# Patient Record
Sex: MALE | NOT HISPANIC OR LATINO | URBAN - METROPOLITAN AREA
[De-identification: names, ages, dates, MRNs, and addresses within clinical notes are randomized per-mention and may not be internally consistent; named-entity substitution may affect disease eponyms.]

---

## 2021-09-13 ENCOUNTER — APPOINTMENT (RX ONLY)
Dept: URBAN - METROPOLITAN AREA CLINIC 349 | Facility: CLINIC | Age: 21
Setting detail: DERMATOLOGY
End: 2021-09-13

## 2021-09-13 DIAGNOSIS — Z80.8 FAMILY HISTORY OF MALIGNANT NEOPLASM OF OTHER ORGANS OR SYSTEMS: ICD-10-CM

## 2021-09-13 DIAGNOSIS — Z12.83 ENCOUNTER FOR SCREENING FOR MALIGNANT NEOPLASM OF SKIN: ICD-10-CM

## 2021-09-13 DIAGNOSIS — L70.0 ACNE VULGARIS: ICD-10-CM | Status: INADEQUATELY CONTROLLED

## 2021-09-13 DIAGNOSIS — L98.8 OTHER SPECIFIED DISORDERS OF THE SKIN AND SUBCUTANEOUS TISSUE: ICD-10-CM

## 2021-09-13 DIAGNOSIS — D22 MELANOCYTIC NEVI: ICD-10-CM

## 2021-09-13 DIAGNOSIS — Z71.89 OTHER SPECIFIED COUNSELING: ICD-10-CM

## 2021-09-13 PROBLEM — D22.4 MELANOCYTIC NEVI OF SCALP AND NECK: Status: ACTIVE | Noted: 2021-09-13

## 2021-09-13 PROCEDURE — ? PRESCRIPTION MEDICATION MANAGEMENT

## 2021-09-13 PROCEDURE — ? PRESCRIPTION

## 2021-09-13 PROCEDURE — ? COUNSELING

## 2021-09-13 PROCEDURE — ? SUNSCREEN RECOMMENDATIONS

## 2021-09-13 PROCEDURE — ? OTHER

## 2021-09-13 PROCEDURE — 99204 OFFICE O/P NEW MOD 45 MIN: CPT

## 2021-09-13 PROCEDURE — ? EDUCATIONAL RESOURCES PROVIDED

## 2021-09-13 RX ORDER — MINOCYCLINE HYDROCHLORIDE 100 MG/1
CAPSULE ORAL
Qty: 45 | Refills: 0 | Status: ERX | COMMUNITY
Start: 2021-09-13

## 2021-09-13 RX ORDER — ADAPALENE AND BENZOYL PEROXIDE 3; 25 MG/G; MG/G
GEL TOPICAL
Qty: 60 | Refills: 3 | Status: ERX | COMMUNITY
Start: 2021-09-13

## 2021-09-13 RX ADMIN — MINOCYCLINE HYDROCHLORIDE: 100 CAPSULE ORAL at 00:00

## 2021-09-13 RX ADMIN — ADAPALENE AND BENZOYL PEROXIDE: 3; 25 GEL TOPICAL at 00:00

## 2021-09-13 ASSESSMENT — LOCATION SIMPLE DESCRIPTION DERM
LOCATION SIMPLE: LEFT UPPER BACK
LOCATION SIMPLE: PLANTAR SURFACE OF RIGHT 3RD TOE
LOCATION SIMPLE: INFERIOR FOREHEAD
LOCATION SIMPLE: LEFT CHEEK
LOCATION SIMPLE: POSTERIOR SCALP
LOCATION SIMPLE: PLANTAR SURFACE OF LEFT 3RD TOE

## 2021-09-13 ASSESSMENT — LOCATION DETAILED DESCRIPTION DERM
LOCATION DETAILED: RIGHT LATERAL PLANTAR 3RD TOE
LOCATION DETAILED: INFERIOR MID FOREHEAD
LOCATION DETAILED: LEFT SUPERIOR OCCIPITAL SCALP
LOCATION DETAILED: LEFT LATERAL PLANTAR 3RD TOE
LOCATION DETAILED: LEFT SUPERIOR MEDIAL UPPER BACK
LOCATION DETAILED: LEFT INFERIOR CENTRAL MALAR CHEEK

## 2021-09-13 ASSESSMENT — LOCATION ZONE DERM
LOCATION ZONE: TOE
LOCATION ZONE: TRUNK
LOCATION ZONE: SCALP
LOCATION ZONE: FACE

## 2021-09-13 NOTE — HPI: PIMPLES (ACNE)
How Severe Is Your Acne?: moderate
Is This A New Presentation, Or A Follow-Up?: Acne
Additional Comments (Use Complete Sentences): Patient has been struggling with acne for several years. Patient stated he was prescribed a topical that dried his face out a good bit. Patients mother stated they were using Differin gel as well.

## 2021-09-13 NOTE — PROCEDURE: COUNSELING
Azithromycin Pregnancy And Lactation Text: This medication is considered safe during pregnancy and is also secreted in breast milk.
Birth Control Pills Counseling: Birth Control Pill Counseling: I discussed with the patient the potential side effects of OCPs including but not limited to increased risk of stroke, heart attack, thrombophlebitis, deep venous thrombosis, hepatic adenomas, breast changes, GI upset, headaches, and depression.  The patient verbalized understanding of the proper use and possible adverse effects of OCPs. All of the patient's questions and concerns were addressed.
Detail Level: Generalized
Erythromycin Pregnancy And Lactation Text: This medication is Pregnancy Category B and is considered safe during pregnancy. It is also excreted in breast milk.
Erythromycin Counseling:  I discussed with the patient the risks of erythromycin including but not limited to GI upset, allergic reaction, drug rash, diarrhea, increase in liver enzymes, and yeast infections.
Detail Level: Detailed
Spironolactone Counseling: Patient advised regarding risks of diarrhea, abdominal pain, hyperkalemia, birth defects (for female patients), liver toxicity and renal toxicity. The patient may need blood work to monitor liver and kidney function and potassium levels while on therapy. The patient verbalized understanding of the proper use and possible adverse effects of spironolactone.  All of the patient's questions and concerns were addressed.
High Dose Vitamin A Pregnancy And Lactation Text: High dose vitamin A therapy is contraindicated during pregnancy and breast feeding.
Include Pregnancy/Lactation Warning?: No
Tazorac Pregnancy And Lactation Text: This medication is not safe during pregnancy. It is unknown if this medication is excreted in breast milk.
Topical Sulfur Applications Counseling: Topical Sulfur Counseling: Patient counseled that this medication may cause skin irritation or allergic reactions.  In the event of skin irritation, the patient was advised to reduce the amount of the drug applied or use it less frequently.   The patient verbalized understanding of the proper use and possible adverse effects of topical sulfur application.  All of the patient's questions and concerns were addressed.
Topical Clindamycin Pregnancy And Lactation Text: This medication is Pregnancy Category B and is considered safe during pregnancy. It is unknown if it is excreted in breast milk.
High Dose Vitamin A Counseling: Side effects reviewed, pt to contact office should one occur.
Detail Level: Zone
Azithromycin Counseling:  I discussed with the patient the risks of azithromycin including but not limited to GI upset, allergic reaction, drug rash, diarrhea, and yeast infections.
Benzoyl Peroxide Pregnancy And Lactation Text: This medication is Pregnancy Category C. It is unknown if benzoyl peroxide is excreted in breast milk.
Topical Clindamycin Counseling: Patient counseled that this medication may cause skin irritation or allergic reactions.  In the event of skin irritation, the patient was advised to reduce the amount of the drug applied or use it less frequently.   The patient verbalized understanding of the proper use and possible adverse effects of clindamycin.  All of the patient's questions and concerns were addressed.
Minocycline Counseling: Patient advised regarding possible photosensitivity and discoloration of the teeth, skin, lips, tongue and gums.  Patient instructed to avoid sunlight, if possible.  When exposed to sunlight, patients should wear protective clothing, sunglasses, and sunscreen.  The patient was instructed to call the office immediately if the following severe adverse effects occur:  hearing changes, easy bruising/bleeding, severe headache, or vision changes.  The patient verbalized understanding of the proper use and possible adverse effects of minocycline.  All of the patient's questions and concerns were addressed.
Dapsone Pregnancy And Lactation Text: This medication is Pregnancy Category C and is not considered safe during pregnancy or breast feeding.
Bactrim Counseling:  I discussed with the patient the risks of sulfa antibiotics including but not limited to GI upset, allergic reaction, drug rash, diarrhea, dizziness, photosensitivity, and yeast infections.  Rarely, more serious reactions can occur including but not limited to aplastic anemia, agranulocytosis, methemoglobinemia, blood dyscrasias, liver or kidney failure, lung infiltrates or desquamative/blistering drug rashes.
Topical Sulfur Applications Pregnancy And Lactation Text: This medication is Pregnancy Category C and has an unknown safety profile during pregnancy. It is unknown if this topical medication is excreted in breast milk.
Bactrim Pregnancy And Lactation Text: This medication is Pregnancy Category D and is known to cause fetal risk.  It is also excreted in breast milk.
Spironolactone Pregnancy And Lactation Text: This medication can cause feminization of the male fetus and should be avoided during pregnancy. The active metabolite is also found in breast milk.
Tetracycline Pregnancy And Lactation Text: This medication is Pregnancy Category D and not consider safe during pregnancy. It is also excreted in breast milk.
Sarecycline Counseling: Patient advised regarding possible photosensitivity and discoloration of the teeth, skin, lips, tongue and gums.  Patient instructed to avoid sunlight, if possible.  When exposed to sunlight, patients should wear protective clothing, sunglasses, and sunscreen.  The patient was instructed to call the office immediately if the following severe adverse effects occur:  hearing changes, easy bruising/bleeding, severe headache, or vision changes.  The patient verbalized understanding of the proper use and possible adverse effects of sarecycline.  All of the patient's questions and concerns were addressed.
Doxycycline Pregnancy And Lactation Text: This medication is Pregnancy Category D and not consider safe during pregnancy. It is also excreted in breast milk but is considered safe for shorter treatment courses.
Isotretinoin Counseling: Patient should get monthly blood tests, not donate blood, not drive at night if vision affected, not share medication, and not undergo elective surgery for 6 months after tx completed. Side effects reviewed, pt to contact office should one occur.
Tazorac Counseling:  Patient advised that medication is irritating and drying.  Patient may need to apply sparingly and wash off after an hour before eventually leaving it on overnight.  The patient verbalized understanding of the proper use and possible adverse effects of tazorac.  All of the patient's questions and concerns were addressed.
Topical Retinoid counseling:  Patient advised to apply a pea-sized amount only at bedtime and wait 30 minutes after washing their face before applying.  If too drying, patient may add a non-comedogenic moisturizer. The patient verbalized understanding of the proper use and possible adverse effects of retinoids.  All of the patient's questions and concerns were addressed.
Benzoyl Peroxide Counseling: Patient counseled that medicine may cause skin irritation and bleach clothing.  In the event of skin irritation, the patient was advised to reduce the amount of the drug applied or use it less frequently.   The patient verbalized understanding of the proper use and possible adverse effects of benzoyl peroxide.  All of the patient's questions and concerns were addressed.
Birth Control Pills Pregnancy And Lactation Text: This medication should be avoided if pregnant and for the first 30 days post-partum.
Doxycycline Counseling:  Patient counseled regarding possible photosensitivity and increased risk for sunburn.  Patient instructed to avoid sunlight, if possible.  When exposed to sunlight, patients should wear protective clothing, sunglasses, and sunscreen.  The patient was instructed to call the office immediately if the following severe adverse effects occur:  hearing changes, easy bruising/bleeding, severe headache, or vision changes.  The patient verbalized understanding of the proper use and possible adverse effects of doxycycline.  All of the patient's questions and concerns were addressed.
Tetracycline Counseling: Patient counseled regarding possible photosensitivity and increased risk for sunburn.  Patient instructed to avoid sunlight, if possible.  When exposed to sunlight, patients should wear protective clothing, sunglasses, and sunscreen.  The patient was instructed to call the office immediately if the following severe adverse effects occur:  hearing changes, easy bruising/bleeding, severe headache, or vision changes.  The patient verbalized understanding of the proper use and possible adverse effects of tetracycline.  All of the patient's questions and concerns were addressed. Patient understands to avoid pregnancy while on therapy due to potential birth defects.
Topical Retinoid Pregnancy And Lactation Text: This medication is Pregnancy Category C. It is unknown if this medication is excreted in breast milk.
Dapsone Counseling: I discussed with the patient the risks of dapsone including but not limited to hemolytic anemia, agranulocytosis, rashes, methemoglobinemia, kidney failure, peripheral neuropathy, headaches, GI upset, and liver toxicity.  Patients who start dapsone require monitoring including baseline LFTs and weekly CBCs for the first month, then every month thereafter.  The patient verbalized understanding of the proper use and possible adverse effects of dapsone.  All of the patient's questions and concerns were addressed.
Isotretinoin Pregnancy And Lactation Text: This medication is Pregnancy Category X and is considered extremely dangerous during pregnancy. It is unknown if it is excreted in breast milk.

## 2021-09-13 NOTE — PROCEDURE: PRESCRIPTION MEDICATION MANAGEMENT
Initiate Treatment: Epiduo forte pea sized amount to entire face once nightly \\n\\nWash face twice daily with gentle cleanser\\n\\nMinocycline 100mg once daily for six weeks
Render In Strict Bullet Format?: No
Detail Level: Zone
Samples Given: Epiduo

## 2021-09-13 NOTE — PROCEDURE: OTHER
Note Text (......Xxx Chief Complaint.): This diagnosis correlates with the
Detail Level: Zone
Other (Free Text): Recommended aquaphor daily as needed
Render Risk Assessment In Note?: yes
Other (Free Text): Patient has been struggling with acne on his face and back for several years. They had been prescribed Clindamycin/tretinoin cream. Patient’s mother stated that they had used that along with some Differin gel and had gotten really dried out.

## 2021-09-13 NOTE — HPI: SKIN LESIONS
How Severe Is Your Skin Lesion?: mild
Have Your Skin Lesions Been Treated?: not been treated
Is This A New Presentation, Or A Follow-Up?: Skin Lesions
Which Family Member (Optional)?: Mother
Additional History: Patient has a lesion on his scalp that has been present for several years. Patient is unsure of any changes.

## 2021-10-25 ENCOUNTER — APPOINTMENT (RX ONLY)
Dept: URBAN - METROPOLITAN AREA CLINIC 349 | Facility: CLINIC | Age: 21
Setting detail: DERMATOLOGY
End: 2021-10-25

## 2021-10-25 DIAGNOSIS — Z80.8 FAMILY HISTORY OF MALIGNANT NEOPLASM OF OTHER ORGANS OR SYSTEMS: ICD-10-CM

## 2021-10-25 DIAGNOSIS — Z12.83 ENCOUNTER FOR SCREENING FOR MALIGNANT NEOPLASM OF SKIN: ICD-10-CM

## 2021-10-25 DIAGNOSIS — Z71.89 OTHER SPECIFIED COUNSELING: ICD-10-CM

## 2021-10-25 DIAGNOSIS — D22 MELANOCYTIC NEVI: ICD-10-CM

## 2021-10-25 DIAGNOSIS — L74.0 MILIARIA RUBRA: ICD-10-CM

## 2021-10-25 DIAGNOSIS — L70.0 ACNE VULGARIS: ICD-10-CM | Status: RESOLVING

## 2021-10-25 DIAGNOSIS — L98.8 OTHER SPECIFIED DISORDERS OF THE SKIN AND SUBCUTANEOUS TISSUE: ICD-10-CM | Status: RESOLVING

## 2021-10-25 PROBLEM — D22.5 MELANOCYTIC NEVI OF TRUNK: Status: ACTIVE | Noted: 2021-10-25

## 2021-10-25 PROBLEM — D22.71 MELANOCYTIC NEVI OF RIGHT LOWER LIMB, INCLUDING HIP: Status: ACTIVE | Noted: 2021-10-25

## 2021-10-25 PROBLEM — D22.72 MELANOCYTIC NEVI OF LEFT LOWER LIMB, INCLUDING HIP: Status: ACTIVE | Noted: 2021-10-25

## 2021-10-25 PROBLEM — D22.4 MELANOCYTIC NEVI OF SCALP AND NECK: Status: ACTIVE | Noted: 2021-10-25

## 2021-10-25 PROCEDURE — ? PRESCRIPTION

## 2021-10-25 PROCEDURE — 99213 OFFICE O/P EST LOW 20 MIN: CPT

## 2021-10-25 PROCEDURE — ? SUNSCREEN RECOMMENDATIONS

## 2021-10-25 PROCEDURE — ? PRESCRIPTION MEDICATION MANAGEMENT

## 2021-10-25 PROCEDURE — ? COUNSELING

## 2021-10-25 PROCEDURE — ? OTHER

## 2021-10-25 RX ORDER — MINOCYCLINE HYDROCHLORIDE 100 MG/1
CAPSULE ORAL
Qty: 30 | Refills: 2 | Status: ERX

## 2021-10-25 ASSESSMENT — LOCATION SIMPLE DESCRIPTION DERM
LOCATION SIMPLE: LEFT POSTERIOR THIGH
LOCATION SIMPLE: INFERIOR FOREHEAD
LOCATION SIMPLE: POSTERIOR SCALP
LOCATION SIMPLE: LEFT PRETIBIAL REGION
LOCATION SIMPLE: LEFT UPPER BACK
LOCATION SIMPLE: RIGHT PRETIBIAL REGION
LOCATION SIMPLE: PLANTAR SURFACE OF LEFT 3RD TOE
LOCATION SIMPLE: LEFT CHEEK
LOCATION SIMPLE: PLANTAR SURFACE OF RIGHT 3RD TOE
LOCATION SIMPLE: ABDOMEN

## 2021-10-25 ASSESSMENT — LOCATION DETAILED DESCRIPTION DERM
LOCATION DETAILED: LEFT SUPERIOR MEDIAL UPPER BACK
LOCATION DETAILED: LEFT INFERIOR MEDIAL UPPER BACK
LOCATION DETAILED: RIGHT PROXIMAL PRETIBIAL REGION
LOCATION DETAILED: LEFT PROXIMAL POSTERIOR THIGH
LOCATION DETAILED: LEFT SUPERIOR UPPER BACK
LOCATION DETAILED: LEFT INFERIOR CENTRAL MALAR CHEEK
LOCATION DETAILED: PERIUMBILICAL SKIN
LOCATION DETAILED: LEFT SUPERIOR OCCIPITAL SCALP
LOCATION DETAILED: LEFT LATERAL PLANTAR 3RD TOE
LOCATION DETAILED: RIGHT LATERAL PLANTAR 3RD TOE
LOCATION DETAILED: LEFT DISTAL PRETIBIAL REGION
LOCATION DETAILED: INFERIOR MID FOREHEAD

## 2021-10-25 ASSESSMENT — LOCATION ZONE DERM
LOCATION ZONE: FACE
LOCATION ZONE: TRUNK
LOCATION ZONE: TOE
LOCATION ZONE: LEG
LOCATION ZONE: SCALP

## 2021-10-25 NOTE — PROCEDURE: PRESCRIPTION MEDICATION MANAGEMENT
Render In Strict Bullet Format?: No
Modify Regimen: Minocycline 100mg once daily for another month and then for a week or two when flared
Detail Level: Zone
Continue Regimen: Epiduo forte pea sized amount to entire face once nightly \\n\\nWash face twice daily with gentle cleanser

## 2021-10-25 NOTE — HPI: SKIN LESIONS
How Severe Is Your Skin Lesion?: mild
Have Your Skin Lesions Been Treated?: not been treated
Is This A New Presentation, Or A Follow-Up?: Skin Lesions
Which Family Member (Optional)?: Mother
Additional History: Patient’s mother would like to have patient’s back looked at. They deny any changes.

## 2021-10-25 NOTE — PROCEDURE: COUNSELING
Detail Level: Zone
Bactrim Pregnancy And Lactation Text: This medication is Pregnancy Category D and is known to cause fetal risk.  It is also excreted in breast milk.
Topical Clindamycin Pregnancy And Lactation Text: This medication is Pregnancy Category B and is considered safe during pregnancy. It is unknown if it is excreted in breast milk.
Erythromycin Pregnancy And Lactation Text: This medication is Pregnancy Category B and is considered safe during pregnancy. It is also excreted in breast milk.
Spironolactone Pregnancy And Lactation Text: This medication can cause feminization of the male fetus and should be avoided during pregnancy. The active metabolite is also found in breast milk.
Dapsone Counseling: I discussed with the patient the risks of dapsone including but not limited to hemolytic anemia, agranulocytosis, rashes, methemoglobinemia, kidney failure, peripheral neuropathy, headaches, GI upset, and liver toxicity.  Patients who start dapsone require monitoring including baseline LFTs and weekly CBCs for the first month, then every month thereafter.  The patient verbalized understanding of the proper use and possible adverse effects of dapsone.  All of the patient's questions and concerns were addressed.
High Dose Vitamin A Pregnancy And Lactation Text: High dose vitamin A therapy is contraindicated during pregnancy and breast feeding.
Dapsone Pregnancy And Lactation Text: This medication is Pregnancy Category C and is not considered safe during pregnancy or breast feeding.
Minocycline Pregnancy And Lactation Text: This medication is Pregnancy Category D and not consider safe during pregnancy. It is also excreted in breast milk.
Topical Retinoid Pregnancy And Lactation Text: This medication is Pregnancy Category C. It is unknown if this medication is excreted in breast milk.
Birth Control Pills Counseling: Birth Control Pill Counseling: I discussed with the patient the potential side effects of OCPs including but not limited to increased risk of stroke, heart attack, thrombophlebitis, deep venous thrombosis, hepatic adenomas, breast changes, GI upset, headaches, and depression.  The patient verbalized understanding of the proper use and possible adverse effects of OCPs. All of the patient's questions and concerns were addressed.
Include Pregnancy/Lactation Warning?: No
Sarecycline Counseling: Patient advised regarding possible photosensitivity and discoloration of the teeth, skin, lips, tongue and gums.  Patient instructed to avoid sunlight, if possible.  When exposed to sunlight, patients should wear protective clothing, sunglasses, and sunscreen.  The patient was instructed to call the office immediately if the following severe adverse effects occur:  hearing changes, easy bruising/bleeding, severe headache, or vision changes.  The patient verbalized understanding of the proper use and possible adverse effects of sarecycline.  All of the patient's questions and concerns were addressed.
Doxycycline Counseling:  Patient counseled regarding possible photosensitivity and increased risk for sunburn.  Patient instructed to avoid sunlight, if possible.  When exposed to sunlight, patients should wear protective clothing, sunglasses, and sunscreen.  The patient was instructed to call the office immediately if the following severe adverse effects occur:  hearing changes, easy bruising/bleeding, severe headache, or vision changes.  The patient verbalized understanding of the proper use and possible adverse effects of doxycycline.  All of the patient's questions and concerns were addressed.
Topical Sulfur Applications Counseling: Topical Sulfur Counseling: Patient counseled that this medication may cause skin irritation or allergic reactions.  In the event of skin irritation, the patient was advised to reduce the amount of the drug applied or use it less frequently.   The patient verbalized understanding of the proper use and possible adverse effects of topical sulfur application.  All of the patient's questions and concerns were addressed.
Bactrim Counseling:  I discussed with the patient the risks of sulfa antibiotics including but not limited to GI upset, allergic reaction, drug rash, diarrhea, dizziness, photosensitivity, and yeast infections.  Rarely, more serious reactions can occur including but not limited to aplastic anemia, agranulocytosis, methemoglobinemia, blood dyscrasias, liver or kidney failure, lung infiltrates or desquamative/blistering drug rashes.
Tazorac Pregnancy And Lactation Text: This medication is not safe during pregnancy. It is unknown if this medication is excreted in breast milk.
Detail Level: Generalized
Spironolactone Counseling: Patient advised regarding risks of diarrhea, abdominal pain, hyperkalemia, birth defects (for female patients), liver toxicity and renal toxicity. The patient may need blood work to monitor liver and kidney function and potassium levels while on therapy. The patient verbalized understanding of the proper use and possible adverse effects of spironolactone.  All of the patient's questions and concerns were addressed.
High Dose Vitamin A Counseling: Side effects reviewed, pt to contact office should one occur.
Topical Sulfur Applications Pregnancy And Lactation Text: This medication is Pregnancy Category C and has an unknown safety profile during pregnancy. It is unknown if this topical medication is excreted in breast milk.
Topical Retinoid counseling:  Patient advised to apply a pea-sized amount only at bedtime and wait 30 minutes after washing their face before applying.  If too drying, patient may add a non-comedogenic moisturizer. The patient verbalized understanding of the proper use and possible adverse effects of retinoids.  All of the patient's questions and concerns were addressed.
Erythromycin Counseling:  I discussed with the patient the risks of erythromycin including but not limited to GI upset, allergic reaction, drug rash, diarrhea, increase in liver enzymes, and yeast infections.
Minocycline Counseling: Patient advised regarding possible photosensitivity and discoloration of the teeth, skin, lips, tongue and gums.  Patient instructed to avoid sunlight, if possible.  When exposed to sunlight, patients should wear protective clothing, sunglasses, and sunscreen.  The patient was instructed to call the office immediately if the following severe adverse effects occur:  hearing changes, easy bruising/bleeding, severe headache, or vision changes.  The patient verbalized understanding of the proper use and possible adverse effects of minocycline.  All of the patient's questions and concerns were addressed.
Azithromycin Counseling:  I discussed with the patient the risks of azithromycin including but not limited to GI upset, allergic reaction, drug rash, diarrhea, and yeast infections.
Topical Clindamycin Counseling: Patient counseled that this medication may cause skin irritation or allergic reactions.  In the event of skin irritation, the patient was advised to reduce the amount of the drug applied or use it less frequently.   The patient verbalized understanding of the proper use and possible adverse effects of clindamycin.  All of the patient's questions and concerns were addressed.
Azithromycin Pregnancy And Lactation Text: This medication is considered safe during pregnancy and is also secreted in breast milk.
Birth Control Pills Pregnancy And Lactation Text: This medication should be avoided if pregnant and for the first 30 days post-partum.
Benzoyl Peroxide Counseling: Patient counseled that medicine may cause skin irritation and bleach clothing.  In the event of skin irritation, the patient was advised to reduce the amount of the drug applied or use it less frequently.   The patient verbalized understanding of the proper use and possible adverse effects of benzoyl peroxide.  All of the patient's questions and concerns were addressed.
Tetracycline Counseling: Patient counseled regarding possible photosensitivity and increased risk for sunburn.  Patient instructed to avoid sunlight, if possible.  When exposed to sunlight, patients should wear protective clothing, sunglasses, and sunscreen.  The patient was instructed to call the office immediately if the following severe adverse effects occur:  hearing changes, easy bruising/bleeding, severe headache, or vision changes.  The patient verbalized understanding of the proper use and possible adverse effects of tetracycline.  All of the patient's questions and concerns were addressed. Patient understands to avoid pregnancy while on therapy due to potential birth defects.
Isotretinoin Pregnancy And Lactation Text: This medication is Pregnancy Category X and is considered extremely dangerous during pregnancy. It is unknown if it is excreted in breast milk.
Benzoyl Peroxide Pregnancy And Lactation Text: This medication is Pregnancy Category C. It is unknown if benzoyl peroxide is excreted in breast milk.
Tazorac Counseling:  Patient advised that medication is irritating and drying.  Patient may need to apply sparingly and wash off after an hour before eventually leaving it on overnight.  The patient verbalized understanding of the proper use and possible adverse effects of tazorac.  All of the patient's questions and concerns were addressed.
Isotretinoin Counseling: Patient should get monthly blood tests, not donate blood, not drive at night if vision affected, not share medication, and not undergo elective surgery for 6 months after tx completed. Side effects reviewed, pt to contact office should one occur.
Doxycycline Pregnancy And Lactation Text: This medication is Pregnancy Category D and not consider safe during pregnancy. It is also excreted in breast milk but is considered safe for shorter treatment courses.

## 2021-10-25 NOTE — PROCEDURE: OTHER
detailed exam
Note Text (......Xxx Chief Complaint.): This diagnosis correlates with the
Other (Free Text): Patient stated he has seen some improvement but has still been breaking out. Discussed continuing the antibiotic for another month and then only for flares.
Detail Level: Zone
Other (Free Text): Recommended aquaphor daily as needed
Render Risk Assessment In Note?: yes
Other (Free Text): They stated it isn’t bothersome. Suggested hydrocortisone cream if it becomes bothersome or to contact the office.

## 2021-11-22 ENCOUNTER — HOSPITAL ENCOUNTER (OUTPATIENT)
Dept: ULTRASOUND IMAGING | Age: 21
Discharge: HOME | End: 2021-11-22
Attending: UROLOGY
Payer: COMMERCIAL

## 2021-11-22 DIAGNOSIS — N31.9 NEUROGENIC BLADDER: ICD-10-CM

## 2021-11-22 PROCEDURE — 76770 US EXAM ABDO BACK WALL COMP: CPT

## 2022-01-31 ENCOUNTER — APPOINTMENT (RX ONLY)
Dept: URBAN - METROPOLITAN AREA CLINIC 349 | Facility: CLINIC | Age: 22
Setting detail: DERMATOLOGY
End: 2022-01-31

## 2022-01-31 DIAGNOSIS — L70.0 ACNE VULGARIS: ICD-10-CM | Status: IMPROVED

## 2022-01-31 PROCEDURE — 99213 OFFICE O/P EST LOW 20 MIN: CPT

## 2022-01-31 PROCEDURE — ? PRESCRIPTION MEDICATION MANAGEMENT

## 2022-01-31 PROCEDURE — ? PRESCRIPTION

## 2022-01-31 PROCEDURE — ? OTHER

## 2022-01-31 PROCEDURE — ? COUNSELING

## 2022-01-31 RX ORDER — ADAPALENE AND BENZOYL PEROXIDE 1; 25 MG/G; MG/G
GEL TOPICAL
Qty: 45 | Refills: 3 | Status: ERX | COMMUNITY
Start: 2022-01-31

## 2022-01-31 RX ADMIN — ADAPALENE AND BENZOYL PEROXIDE: 1; 25 GEL TOPICAL at 00:00

## 2022-01-31 ASSESSMENT — LOCATION SIMPLE DESCRIPTION DERM
LOCATION SIMPLE: INFERIOR FOREHEAD
LOCATION SIMPLE: LEFT UPPER BACK
LOCATION SIMPLE: LEFT CHEEK

## 2022-01-31 ASSESSMENT — LOCATION DETAILED DESCRIPTION DERM
LOCATION DETAILED: LEFT INFERIOR CENTRAL MALAR CHEEK
LOCATION DETAILED: INFERIOR MID FOREHEAD
LOCATION DETAILED: LEFT SUPERIOR MEDIAL UPPER BACK

## 2022-01-31 ASSESSMENT — LOCATION ZONE DERM
LOCATION ZONE: FACE
LOCATION ZONE: TRUNK

## 2022-01-31 NOTE — PROCEDURE: OTHER
Note Text (......Xxx Chief Complaint.): This diagnosis correlates with the
Other (Free Text): Patient and patient?s mother stated they have seen improvement and are happy with treatment regimen. They stated they haven?t needed the antibiotic.
Detail Level: Zone
Render Risk Assessment In Note?: yes

## 2022-01-31 NOTE — PROCEDURE: COUNSELING
Tazorac Counseling:  Patient advised that medication is irritating and drying.  Patient may need to apply sparingly and wash off after an hour before eventually leaving it on overnight.  The patient verbalized understanding of the proper use and possible adverse effects of tazorac.  All of the patient's questions and concerns were addressed.
Sarecycline Pregnancy And Lactation Text: This medication is Pregnancy Category D and not consider safe during pregnancy. It is also excreted in breast milk.
Spironolactone Counseling: Patient advised regarding risks of diarrhea, abdominal pain, hyperkalemia, birth defects (for female patients), liver toxicity and renal toxicity. The patient may need blood work to monitor liver and kidney function and potassium levels while on therapy. The patient verbalized understanding of the proper use and possible adverse effects of spironolactone.  All of the patient's questions and concerns were addressed.
Isotretinoin Pregnancy And Lactation Text: This medication is Pregnancy Category X and is considered extremely dangerous during pregnancy. It is unknown if it is excreted in breast milk.
Use Enhanced Medication Counseling?: No
Doxycycline Pregnancy And Lactation Text: This medication is Pregnancy Category D and not consider safe during pregnancy. It is also excreted in breast milk but is considered safe for shorter treatment courses.
Minocycline Counseling: Patient advised regarding possible photosensitivity and discoloration of the teeth, skin, lips, tongue and gums.  Patient instructed to avoid sunlight, if possible.  When exposed to sunlight, patients should wear protective clothing, sunglasses, and sunscreen.  The patient was instructed to call the office immediately if the following severe adverse effects occur:  hearing changes, easy bruising/bleeding, severe headache, or vision changes.  The patient verbalized understanding of the proper use and possible adverse effects of minocycline.  All of the patient's questions and concerns were addressed.
Azithromycin Counseling:  I discussed with the patient the risks of azithromycin including but not limited to GI upset, allergic reaction, drug rash, diarrhea, and yeast infections.
Isotretinoin Counseling: Patient should get monthly blood tests, not donate blood, not drive at night if vision affected, not share medication, and not undergo elective surgery for 6 months after tx completed. Side effects reviewed, pt to contact office should one occur.
Detail Level: Zone
Dapsone Counseling: I discussed with the patient the risks of dapsone including but not limited to hemolytic anemia, agranulocytosis, rashes, methemoglobinemia, kidney failure, peripheral neuropathy, headaches, GI upset, and liver toxicity.  Patients who start dapsone require monitoring including baseline LFTs and weekly CBCs for the first month, then every month thereafter.  The patient verbalized understanding of the proper use and possible adverse effects of dapsone.  All of the patient's questions and concerns were addressed.
High Dose Vitamin A Pregnancy And Lactation Text: High dose vitamin A therapy is contraindicated during pregnancy and breast feeding.
Birth Control Pills Counseling: Birth Control Pill Counseling: I discussed with the patient the potential side effects of OCPs including but not limited to increased risk of stroke, heart attack, thrombophlebitis, deep venous thrombosis, hepatic adenomas, breast changes, GI upset, headaches, and depression.  The patient verbalized understanding of the proper use and possible adverse effects of OCPs. All of the patient's questions and concerns were addressed.
Tetracycline Counseling: Patient counseled regarding possible photosensitivity and increased risk for sunburn.  Patient instructed to avoid sunlight, if possible.  When exposed to sunlight, patients should wear protective clothing, sunglasses, and sunscreen.  The patient was instructed to call the office immediately if the following severe adverse effects occur:  hearing changes, easy bruising/bleeding, severe headache, or vision changes.  The patient verbalized understanding of the proper use and possible adverse effects of tetracycline.  All of the patient's questions and concerns were addressed. Patient understands to avoid pregnancy while on therapy due to potential birth defects.
Spironolactone Pregnancy And Lactation Text: This medication can cause feminization of the male fetus and should be avoided during pregnancy. The active metabolite is also found in breast milk.
Benzoyl Peroxide Counseling: Patient counseled that medicine may cause skin irritation and bleach clothing.  In the event of skin irritation, the patient was advised to reduce the amount of the drug applied or use it less frequently.   The patient verbalized understanding of the proper use and possible adverse effects of benzoyl peroxide.  All of the patient's questions and concerns were addressed.
Dapsone Pregnancy And Lactation Text: This medication is Pregnancy Category C and is not considered safe during pregnancy or breast feeding.
Sarecycline Counseling: Patient advised regarding possible photosensitivity and discoloration of the teeth, skin, lips, tongue and gums.  Patient instructed to avoid sunlight, if possible.  When exposed to sunlight, patients should wear protective clothing, sunglasses, and sunscreen.  The patient was instructed to call the office immediately if the following severe adverse effects occur:  hearing changes, easy bruising/bleeding, severe headache, or vision changes.  The patient verbalized understanding of the proper use and possible adverse effects of sarecycline.  All of the patient's questions and concerns were addressed.
Topical Sulfur Applications Counseling: Topical Sulfur Counseling: Patient counseled that this medication may cause skin irritation or allergic reactions.  In the event of skin irritation, the patient was advised to reduce the amount of the drug applied or use it less frequently.   The patient verbalized understanding of the proper use and possible adverse effects of topical sulfur application.  All of the patient's questions and concerns were addressed.
Bactrim Counseling:  I discussed with the patient the risks of sulfa antibiotics including but not limited to GI upset, allergic reaction, drug rash, diarrhea, dizziness, photosensitivity, and yeast infections.  Rarely, more serious reactions can occur including but not limited to aplastic anemia, agranulocytosis, methemoglobinemia, blood dyscrasias, liver or kidney failure, lung infiltrates or desquamative/blistering drug rashes.
Bactrim Pregnancy And Lactation Text: This medication is Pregnancy Category D and is known to cause fetal risk.  It is also excreted in breast milk.
Doxycycline Counseling:  Patient counseled regarding possible photosensitivity and increased risk for sunburn.  Patient instructed to avoid sunlight, if possible.  When exposed to sunlight, patients should wear protective clothing, sunglasses, and sunscreen.  The patient was instructed to call the office immediately if the following severe adverse effects occur:  hearing changes, easy bruising/bleeding, severe headache, or vision changes.  The patient verbalized understanding of the proper use and possible adverse effects of doxycycline.  All of the patient's questions and concerns were addressed.
Erythromycin Pregnancy And Lactation Text: This medication is Pregnancy Category B and is considered safe during pregnancy. It is also excreted in breast milk.
Tazorac Pregnancy And Lactation Text: This medication is not safe during pregnancy. It is unknown if this medication is excreted in breast milk.
Topical Retinoid counseling:  Patient advised to apply a pea-sized amount only at bedtime and wait 30 minutes after washing their face before applying.  If too drying, patient may add a non-comedogenic moisturizer. The patient verbalized understanding of the proper use and possible adverse effects of retinoids.  All of the patient's questions and concerns were addressed.
Azithromycin Pregnancy And Lactation Text: This medication is considered safe during pregnancy and is also secreted in breast milk.
Birth Control Pills Pregnancy And Lactation Text: This medication should be avoided if pregnant and for the first 30 days post-partum.
Benzoyl Peroxide Pregnancy And Lactation Text: This medication is Pregnancy Category C. It is unknown if benzoyl peroxide is excreted in breast milk.
High Dose Vitamin A Counseling: Side effects reviewed, pt to contact office should one occur.
Topical Sulfur Applications Pregnancy And Lactation Text: This medication is Pregnancy Category C and has an unknown safety profile during pregnancy. It is unknown if this topical medication is excreted in breast milk.
Topical Clindamycin Counseling: Patient counseled that this medication may cause skin irritation or allergic reactions.  In the event of skin irritation, the patient was advised to reduce the amount of the drug applied or use it less frequently.   The patient verbalized understanding of the proper use and possible adverse effects of clindamycin.  All of the patient's questions and concerns were addressed.
Topical Clindamycin Pregnancy And Lactation Text: This medication is Pregnancy Category B and is considered safe during pregnancy. It is unknown if it is excreted in breast milk.
Topical Retinoid Pregnancy And Lactation Text: This medication is Pregnancy Category C. It is unknown if this medication is excreted in breast milk.
Erythromycin Counseling:  I discussed with the patient the risks of erythromycin including but not limited to GI upset, allergic reaction, drug rash, diarrhea, increase in liver enzymes, and yeast infections.

## 2022-01-31 NOTE — PROCEDURE: PRESCRIPTION MEDICATION MANAGEMENT
Render In Strict Bullet Format?: No
Continue Regimen: Epiduo forte pea sized amount to entire face once nightly \\n\\nWash face twice daily with gentle cleanser\\n\\nMinocycline 100mg once daily  for a week or two when flared
Detail Level: Zone

## 2022-10-04 ENCOUNTER — OFFICE VISIT (OUTPATIENT)
Dept: UROLOGY | Age: 22
End: 2022-10-04
Payer: COMMERCIAL

## 2022-10-04 DIAGNOSIS — N13.5 URETERAL STRICTURE: Primary | ICD-10-CM

## 2022-10-04 DIAGNOSIS — N31.9 NEUROMUSCULAR DYSFUNCTION OF BLADDER, UNSPECIFIED: ICD-10-CM

## 2022-10-04 LAB
BILIRUBIN, URINE, POC: NEGATIVE
BLOOD URINE, POC: ABNORMAL
GLUCOSE URINE, POC: NEGATIVE
KETONES, URINE, POC: NEGATIVE
LEUKOCYTE ESTERASE, URINE, POC: ABNORMAL
NITRITE, URINE, POC: NEGATIVE
PH, URINE, POC: 7 (ref 4.6–8)
PROTEIN,URINE, POC: 30
SPECIFIC GRAVITY, URINE, POC: 1.01 (ref 1–1.03)
URINALYSIS CLARITY, POC: ABNORMAL
URINALYSIS COLOR, POC: ABNORMAL
UROBILINOGEN, POC: ABNORMAL

## 2022-10-04 PROCEDURE — 99214 OFFICE O/P EST MOD 30 MIN: CPT | Performed by: NURSE PRACTITIONER

## 2022-10-04 PROCEDURE — 81003 URINALYSIS AUTO W/O SCOPE: CPT | Performed by: NURSE PRACTITIONER

## 2022-10-04 NOTE — PROGRESS NOTES
[facility]  [address]    [address], [address]  [phone]          Юлия Chaudhry  : 2000    Chief Complaint   Patient presents with    Other     Having issue getting cath in/self caths           HPI     Юлия Chaudhry is a 25 y.o. male with a PMH of spina bifida and neurogenic bladder on CIC who returns for follow up. Accompanied by mother and father. Here today w difficulty passing catheter. He is currently using a 14 fr coude catheter. Has noticed over the past couple of weeks catheter does not pass as easily as it typically does. Pressure applied to pass catheter and seems there is blockage preventing this. There has been blood present for last 2-3 days. Denies fever, N/V, or chills. About 6 months ago they changed from coloplast catheters to cure catheters. Ditropan 10 mg XL was given last year for UUI but he took it only for a short time, as he did not see significant improvement in UUI and was having side effect. No longer taking. He previously followed with Dr. Carlo Tai at [facility] pediatric urology. He has a history of bladder augmentation for a small capacity bladder with high bladder pressures in 2016 and has done well since that time. He has not had post-op urodynamics since the augmentation but had a renal US in 2021 which showed NO hydronephrosis or other upper tract abnormalities. He is on CIC 4-5 times daily per urethra and irrigates his bladder with 200 cc daily. He does have some mild leaking between caths that requires 1-2 PPD but this is not bothersome to him. No UTIs since his augmentation. No gross hematuria. He is not on any bladder medications at this time. He has never had a cystoscopy. As for his bowels, he uses a daily irrigation program for daily bowel movements and has done well with that.        Urodynamics were attempted to get a baseline prior to today's appointment but the catheter could not be placed due to his anatomy and urodynamics were aborted. History reviewed. No pertinent past medical history. History reviewed. No pertinent surgical history. Current Outpatient Medications   Medication Sig Dispense Refill    acetaminophen (TYLENOL) 325 MG tablet Take 325 mg by mouth as needed      cetirizine (ZYRTEC) 10 MG tablet Take by mouth      vitamin D (CHOLECALCIFEROL) 25 MCG (1000 UT) TABS tablet Take 1,000 Units by mouth daily      lactobacillus (CULTURELLE) CAPS capsule Take 1 capsule by mouth daily      RABEprazole (ACIPHEX) 20 MG tablet TAKE ONE TABLET BY MOUTH ONE TIME DAILY      sodium chloride 0.9 % irrigation Irrigation route daily for 30 doses. Via rosales catheter       No current facility-administered medications for this visit. Allergies   Allergen Reactions    Latex Other (See Comments)     Social History     Socioeconomic History    Marital status: Single     Spouse name: Not on file    Number of children: Not on file    Years of education: Not on file    Highest education level: Not on file   Occupational History    Not on file   Tobacco Use    Smoking status: Not on file    Smokeless tobacco: Not on file   Substance and Sexual Activity    Alcohol use: Not on file    Drug use: Not on file    Sexual activity: Not on file   Other Topics Concern    Not on file   Social History Narrative    Not on file     Social Determinants of Health     Financial Resource Strain: Not on file   Food Insecurity: Not on file   Transportation Needs: Not on file   Physical Activity: Not on file   Stress: Not on file   Social Connections: Not on file   Intimate Partner Violence: Not on file   Housing Stability: Not on file     History reviewed. No pertinent family history.     Review of Systems  Constitutional: Negative  GI: Neg GI ROS  Genitourinary: Genitourinary negative    Urinalysis  UA - Dipstick  Results for orders placed or performed in visit on 10/04/22   AMB POC URINALYSIS DIP STICK AUTO W/O MICRO   Result Value Ref Range    Color (UA POC)      Clarity (UA POC)      Glucose, Urine, POC Negative Negative    Bilirubin, Urine, POC Negative Negative    KETONES, Urine, POC Negative Negative    Specific Gravity, Urine, POC 1.010 1.001 - 1.035    Blood (UA POC) Large Negative    pH, Urine, POC 7.0 4.6 - 8.0    Protein, Urine, POC 30 Negative    Urobilinogen, POC 2 mg/dL     Nitrite, Urine, POC Negative Negative    Leukocyte Esterase, Urine, POC Small Negative       PHYSICAL EXAM    There were no vitals taken for this visit. General appearance - well appearing and in no distress, in wheelchair  Mental status - alert, oriented to person, place, and time  Neck - supple, no significant adenopathy   Heart- RRR Normal S1/S2, No murmurs  Chest/Lung-  Quiet, even and easy respiratory effort without use of accessory muscles, BS clear  Neurological - normal speech, no focal findings or movement disorder noted on gross visual exam  Musculoskeletal - normal gait and station  Skin - normal coloration and turgor, no rashes          Assessment and Plan    ICD-10-CM    1. Ureteral stricture  N13.5       2. Neuromuscular dysfunction of bladder, unspecified  N31.9 AMB POC URINALYSIS DIP STICK AUTO W/O MICRO        Attempted to pass a 12 fr coloplast catheter in office. This did not pass easily and resistance was met. We discussed cysto for further eval. Procedure for this explained and all questions answered. Will arrange in office. They will continue CIC 4-5 times per day w 14 fr. They are contemplating SPT placement. This can be discussed w Dr. Alesha Esparza after cysto. Follow up pending cysto. To call sooner if needed. Faye Pop is supervising physician today and he approves plan of care. I have spent 35 minutes today reviewing previous notes, test results and face to face with the patient as well as documenting.

## 2022-10-05 ENCOUNTER — TELEPHONE (OUTPATIENT)
Dept: UROLOGY | Age: 22
End: 2022-10-05

## 2022-10-12 ENCOUNTER — PROCEDURE VISIT (OUTPATIENT)
Dept: UROLOGY | Age: 22
End: 2022-10-12
Payer: COMMERCIAL

## 2022-10-12 DIAGNOSIS — N31.9 NEUROMUSCULAR DYSFUNCTION OF BLADDER, UNSPECIFIED: Primary | ICD-10-CM

## 2022-10-12 DIAGNOSIS — N35.011 POST-TRAUMATIC BULBOUS URETHRAL STRICTURE: ICD-10-CM

## 2022-10-12 DIAGNOSIS — N40.0 BENIGN PROSTATIC HYPERPLASIA WITHOUT LOWER URINARY TRACT SYMPTOMS: ICD-10-CM

## 2022-10-12 PROCEDURE — 52000 CYSTOURETHROSCOPY: CPT | Performed by: UROLOGY

## 2022-10-12 PROCEDURE — 99214 OFFICE O/P EST MOD 30 MIN: CPT | Performed by: UROLOGY

## 2022-10-12 NOTE — PROGRESS NOTES
[facility]  [address]   [address]  [facility], [address]  [phone]    Caden Roche  : 2000    HPI   25 y.o.  male who presents for cystoscopy to evaluate urethra due to difficulty with self cathing. He has a PMH of spina bifida and neurogenic bladder on CIC 4-5 times daily. More recently, he has had difficulty with self cathing and saw blood. Today, he has been unable to pass catheter at all. Cysto was recommended to rule out false passage. He has a history of bladder augmentation for a small capacity bladder with high bladder pressures in  and has done well since that time. He has not had post-op urodynamics since the augmentation but had a renal US in 2021 which showed NO hydronephrosis or other upper tract abnormalities. He does have some mild leaking between caths that requires 1-2 PPD but this is not bothersome to him. Has tried ditropan in the past but did not like side effects, so stopped. No UTIs since his augmentation. No gross hematuria. He is not on any bladder medications at this time. As for his bowels, he uses a daily irrigation program for daily bowel movements and has done well with that. Urodynamics were attempted to get a baseline in  but aborted due to difficult anatomy that prevented passage of urodynamics catheter. History reviewed. No pertinent past medical history. History reviewed. No pertinent surgical history. Current Outpatient Medications   Medication Sig Dispense Refill    sodium chloride 0.9 % irrigation Irrigation route daily for 30 doses.  Via rosales catheter 2000 mL 11    acetaminophen (TYLENOL) 325 MG tablet Take 325 mg by mouth as needed      cetirizine (ZYRTEC) 10 MG tablet Take by mouth      vitamin D (CHOLECALCIFEROL) 25 MCG (1000 UT) TABS tablet Take 1,000 Units by mouth daily      lactobacillus (CULTURELLE) CAPS capsule Take 1 capsule by mouth daily      RABEprazole (ACIPHEX) 20 MG tablet TAKE ONE TABLET BY MOUTH ONE TIME DAILY       No current facility-administered medications for this visit. Allergies   Allergen Reactions    Latex Other (See Comments)     Social History     Socioeconomic History    Marital status: Single     Spouse name: Not on file    Number of children: Not on file    Years of education: Not on file    Highest education level: Not on file   Occupational History    Not on file   Tobacco Use    Smoking status: Not on file    Smokeless tobacco: Not on file   Substance and Sexual Activity    Alcohol use: Not on file    Drug use: Not on file    Sexual activity: Not on file   Other Topics Concern    Not on file   Social History Narrative    Not on file     Social Determinants of Health     Financial Resource Strain: Not on file   Food Insecurity: Not on file   Transportation Needs: Not on file   Physical Activity: Not on file   Stress: Not on file   Social Connections: Not on file   Intimate Partner Violence: Not on file   Housing Stability: Not on file     History reviewed. No pertinent family history. There were no vitals taken for this visit. UA - Dipstick  Results for orders placed or performed in visit on 10/04/22   AMB POC URINALYSIS DIP STICK AUTO W/O MICRO   Result Value Ref Range    Color (UA POC)      Clarity (UA POC)      Glucose, Urine, POC Negative Negative    Bilirubin, Urine, POC Negative Negative    KETONES, Urine, POC Negative Negative    Specific Gravity, Urine, POC 1.010 1.001 - 1.035    Blood (UA POC) Large Negative    pH, Urine, POC 7.0 4.6 - 8.0    Protein, Urine, POC 30 Negative    Urobilinogen, POC 2 mg/dL     Nitrite, Urine, POC Negative Negative    Leukocyte Esterase, Urine, POC Small Negative       There were no vitals taken for this visit.      GENERAL: No acute distress, Awake, Alert, Oriented X 3, Gait normal  CARDIAC: regular rate and rhythm  CHEST AND LUNG: Easy work of breathing, clear to auscultation bilaterally, no cyanosis  ABDOMEN: soft, non tender, non-distended, positive bowel sounds, no organomegaly, no palpable masses, no guarding, no rebound tenderness  SKIN: No rash, no erythema, no lacerations or abrasions, no ecchymosis  NEUROLOGIC: cranial nerves 2-12 grossly intact       Cystoscopy Procedure:     Procedure Room # 1  Scope ID: Dispo  Assistant: EMILEE    All risks, benefits and alternatives were again reviewed with patient and she is willing to proceed at this time. Her genital area was prepped and draped and a sterile field applied. 2% lidocaine jelly was injected in the the urethra and allowed to dwell for several minutes. A flexible cystoscope was then inserted into the urethral meatus and advanced under direct vision. The urethra appeared inflamed in membranous area just before entry into prostate. Wide bore strictures present throughout that accommodated cystoscope. The bladder neck was high riding with lateral lobe encroachment in prostate but mostly open without scarring, contractures, frons or tumors present. The bladder was systematically surveyed. No bladder trabeculations were seen. No mucosal abnormalities were seen. The ureteral orifices were seen in their normal orthotopic position. The cystoscope was then removed under direct vision. The patient tolerated the procedure well. Assessment and Plan    ICD-10-CM    1. Neuromuscular dysfunction of bladder, unspecified  N31.9 CYSTOURETHROSCOPY      2. Post-traumatic bulbous urethral stricture  N35.011 CYSTOURETHROSCOPY      3. Benign prostatic hyperplasia without lower urinary tract symptoms  N40.0         Orders Placed This Encounter   Procedures    CYSTOURETHROSCOPY     Urethral Stricture:   Patient had swelling / irritation and wide bore urethral strictures that made caths difficult. Recommended 2 weeks urethral rest with placement of indwelling rosales today.      He will then return for rosales removal and resume cathing per his normal routine with 14 coudet due to BPH and bladder neck anatomy. Neurogenic Bladder:   Discussed with patient and family options of continuing CIC vs. Indwelling rosales vs. SP tube vs. Catheterizable channel. We discussed that CIC is ideal if feasible due to lower UTI risk and no requirement for rosales bag. SP tube or channel would be invasive surgery to create given his history of bladder augmentation. They will try to resume CIC after 2 weeks urethral rest.    Keep follow up with me as scheduled 12/2022. Patient had a complete established visit today for long term neurogenic bladder management that is separately identifiable from procedure performed today for identification of urethral stricture / difficulty with cathing. Complete documentation of this separate visit is present. I have spent 30 minutes today reviewing previous notes, test results and face to face with the patient as well as documenting. Bennie Ashraf M.D.     [facility]  [place] [address]  Phone: [phone]  Fax: [phone]

## 2022-10-13 DIAGNOSIS — N31.9 NEUROMUSCULAR DYSFUNCTION OF BLADDER, UNSPECIFIED: Primary | ICD-10-CM

## 2022-10-13 RX ORDER — MAGNESIUM HYDROXIDE 1200 MG/15ML
LIQUID ORAL
Qty: 2000 ML | Refills: 11 | Status: SHIPPED | OUTPATIENT
Start: 2022-10-13 | End: 2022-10-20 | Stop reason: SDUPTHER

## 2022-10-13 NOTE — TELEPHONE ENCOUNTER
----- Message from Tierra Barrientos MD sent at 10/12/2022 11:17 AM EDT -----  Regarding: Refill Normal Salien  Please refill normal saline and sent to patient's pharmacy for catheter irrigation. Thanks!   lAyce

## 2022-10-20 ENCOUNTER — TRANSCRIBE ORDERS (OUTPATIENT)
Dept: UROLOGY | Age: 22
End: 2022-10-20

## 2022-10-20 DIAGNOSIS — N31.9 NEUROMUSCULAR DYSFUNCTION OF BLADDER, UNSPECIFIED: ICD-10-CM

## 2022-10-20 RX ORDER — MAGNESIUM HYDROXIDE 1200 MG/15ML
LIQUID ORAL
Qty: 6000 ML | Refills: 11 | Status: SHIPPED | OUTPATIENT
Start: 2022-10-20

## 2022-11-15 ENCOUNTER — APPOINTMENT (RX ONLY)
Dept: URBAN - METROPOLITAN AREA CLINIC 330 | Facility: CLINIC | Age: 22
Setting detail: DERMATOLOGY
End: 2022-11-15

## 2022-11-15 DIAGNOSIS — L70.0 ACNE VULGARIS: ICD-10-CM | Status: STABLE

## 2022-11-15 DIAGNOSIS — D22 MELANOCYTIC NEVI: ICD-10-CM

## 2022-11-15 DIAGNOSIS — Z71.89 OTHER SPECIFIED COUNSELING: ICD-10-CM

## 2022-11-15 DIAGNOSIS — Z80.8 FAMILY HISTORY OF MALIGNANT NEOPLASM OF OTHER ORGANS OR SYSTEMS: ICD-10-CM

## 2022-11-15 DIAGNOSIS — Z12.83 ENCOUNTER FOR SCREENING FOR MALIGNANT NEOPLASM OF SKIN: ICD-10-CM

## 2022-11-15 PROBLEM — D22.4 MELANOCYTIC NEVI OF SCALP AND NECK: Status: ACTIVE | Noted: 2022-11-15

## 2022-11-15 PROBLEM — D22.72 MELANOCYTIC NEVI OF LEFT LOWER LIMB, INCLUDING HIP: Status: ACTIVE | Noted: 2022-11-15

## 2022-11-15 PROBLEM — D22.5 MELANOCYTIC NEVI OF TRUNK: Status: ACTIVE | Noted: 2022-11-15

## 2022-11-15 PROBLEM — D22.71 MELANOCYTIC NEVI OF RIGHT LOWER LIMB, INCLUDING HIP: Status: ACTIVE | Noted: 2022-11-15

## 2022-11-15 PROCEDURE — 99213 OFFICE O/P EST LOW 20 MIN: CPT

## 2022-11-15 PROCEDURE — ? COUNSELING

## 2022-11-15 PROCEDURE — ? SUNSCREEN RECOMMENDATIONS

## 2022-11-15 PROCEDURE — ? PRESCRIPTION

## 2022-11-15 PROCEDURE — ? FULL BODY SKIN EXAM

## 2022-11-15 PROCEDURE — ? PRESCRIPTION MEDICATION MANAGEMENT

## 2022-11-15 RX ORDER — CLINDAMYCIN PHOSPHATE 10 MG/G
GEL TOPICAL
Qty: 60 | Refills: 4 | Status: ERX | COMMUNITY
Start: 2022-11-15

## 2022-11-15 RX ORDER — CLINDAMYCIN PHOSPHATE 10 MG/G
GEL TOPICAL
Qty: 60 | Refills: 4 | Status: CANCELLED | COMMUNITY
Start: 2022-11-15

## 2022-11-15 RX ORDER — ADAPALENE AND BENZOYL PEROXIDE 1; 25 MG/G; MG/G
GEL TOPICAL
Qty: 45 | Refills: 3 | Status: ERX | COMMUNITY
Start: 2022-11-15

## 2022-11-15 RX ADMIN — ADAPALENE AND BENZOYL PEROXIDE: 1; 25 GEL TOPICAL at 00:00

## 2022-11-15 RX ADMIN — CLINDAMYCIN PHOSPHATE: 10 GEL TOPICAL at 00:00

## 2022-11-15 ASSESSMENT — LOCATION DETAILED DESCRIPTION DERM
LOCATION DETAILED: RIGHT PROXIMAL PRETIBIAL REGION
LOCATION DETAILED: PERIUMBILICAL SKIN
LOCATION DETAILED: LEFT SUPERIOR OCCIPITAL SCALP
LOCATION DETAILED: LEFT DISTAL PRETIBIAL REGION
LOCATION DETAILED: LEFT SUPERIOR MEDIAL UPPER BACK
LOCATION DETAILED: INFERIOR MID FOREHEAD
LOCATION DETAILED: LEFT INFERIOR CENTRAL MALAR CHEEK
LOCATION DETAILED: LEFT INFERIOR MEDIAL UPPER BACK
LOCATION DETAILED: LEFT PROXIMAL POSTERIOR THIGH

## 2022-11-15 ASSESSMENT — LOCATION SIMPLE DESCRIPTION DERM
LOCATION SIMPLE: LEFT UPPER BACK
LOCATION SIMPLE: POSTERIOR SCALP
LOCATION SIMPLE: RIGHT PRETIBIAL REGION
LOCATION SIMPLE: INFERIOR FOREHEAD
LOCATION SIMPLE: ABDOMEN
LOCATION SIMPLE: LEFT PRETIBIAL REGION
LOCATION SIMPLE: LEFT CHEEK
LOCATION SIMPLE: LEFT POSTERIOR THIGH

## 2022-11-15 ASSESSMENT — LOCATION ZONE DERM
LOCATION ZONE: LEG
LOCATION ZONE: TRUNK
LOCATION ZONE: SCALP
LOCATION ZONE: FACE

## 2022-11-15 NOTE — PROCEDURE: MIPS QUALITY
Detail Level: Zone
Quality 110: Preventive Care And Screening: Influenza Immunization: Influenza Immunization previously received during influenza season
Detail Level: Detailed
Quality 110: Preventive Care And Screening: Influenza Immunization: Influenza Immunization Administered during Influenza season
Quality 226: Preventive Care And Screening: Tobacco Use: Screening And Cessation Intervention: Patient screened for tobacco use and is an ex/non-smoker
Quality 130: Documentation Of Current Medications In The Medical Record: Current Medications Documented
Quality 431: Preventive Care And Screening: Unhealthy Alcohol Use - Screening: Patient not identified as an unhealthy alcohol user when screened for unhealthy alcohol use using a systematic screening method

## 2022-11-15 NOTE — PROCEDURE: PRESCRIPTION MEDICATION MANAGEMENT
Render In Strict Bullet Format?: No
Continue Regimen: Epiduo forte pea sized amount to entire face once nightly \\n\\nWash face twice daily with gentle cleanser\\n\\nMinocycline 100mg once daily  for a week or two when flared
Initiate Treatment: Clindamycin gel apply to area every morning
Detail Level: Zone

## 2022-11-15 NOTE — PROCEDURE: COUNSELING
Tazorac Counseling:  Patient advised that medication is irritating and drying.  Patient may need to apply sparingly and wash off after an hour before eventually leaving it on overnight.  The patient verbalized understanding of the proper use and possible adverse effects of tazorac.  All of the patient's questions and concerns were addressed.
Sarecycline Pregnancy And Lactation Text: This medication is Pregnancy Category D and not consider safe during pregnancy. It is also excreted in breast milk.
Spironolactone Counseling: Patient advised regarding risks of diarrhea, abdominal pain, hyperkalemia, birth defects (for female patients), liver toxicity and renal toxicity. The patient may need blood work to monitor liver and kidney function and potassium levels while on therapy. The patient verbalized understanding of the proper use and possible adverse effects of spironolactone.  All of the patient's questions and concerns were addressed.
Isotretinoin Pregnancy And Lactation Text: This medication is Pregnancy Category X and is considered extremely dangerous during pregnancy. It is unknown if it is excreted in breast milk.
Use Enhanced Medication Counseling?: No
Doxycycline Pregnancy And Lactation Text: This medication is Pregnancy Category D and not consider safe during pregnancy. It is also excreted in breast milk but is considered safe for shorter treatment courses.
Minocycline Counseling: Patient advised regarding possible photosensitivity and discoloration of the teeth, skin, lips, tongue and gums.  Patient instructed to avoid sunlight, if possible.  When exposed to sunlight, patients should wear protective clothing, sunglasses, and sunscreen.  The patient was instructed to call the office immediately if the following severe adverse effects occur:  hearing changes, easy bruising/bleeding, severe headache, or vision changes.  The patient verbalized understanding of the proper use and possible adverse effects of minocycline.  All of the patient's questions and concerns were addressed.
Azithromycin Counseling:  I discussed with the patient the risks of azithromycin including but not limited to GI upset, allergic reaction, drug rash, diarrhea, and yeast infections.
Isotretinoin Counseling: Patient should get monthly blood tests, not donate blood, not drive at night if vision affected, not share medication, and not undergo elective surgery for 6 months after tx completed. Side effects reviewed, pt to contact office should one occur.
Detail Level: Zone
Dapsone Counseling: I discussed with the patient the risks of dapsone including but not limited to hemolytic anemia, agranulocytosis, rashes, methemoglobinemia, kidney failure, peripheral neuropathy, headaches, GI upset, and liver toxicity.  Patients who start dapsone require monitoring including baseline LFTs and weekly CBCs for the first month, then every month thereafter.  The patient verbalized understanding of the proper use and possible adverse effects of dapsone.  All of the patient's questions and concerns were addressed.
High Dose Vitamin A Pregnancy And Lactation Text: High dose vitamin A therapy is contraindicated during pregnancy and breast feeding.
Birth Control Pills Counseling: Birth Control Pill Counseling: I discussed with the patient the potential side effects of OCPs including but not limited to increased risk of stroke, heart attack, thrombophlebitis, deep venous thrombosis, hepatic adenomas, breast changes, GI upset, headaches, and depression.  The patient verbalized understanding of the proper use and possible adverse effects of OCPs. All of the patient's questions and concerns were addressed.
Tetracycline Counseling: Patient counseled regarding possible photosensitivity and increased risk for sunburn.  Patient instructed to avoid sunlight, if possible.  When exposed to sunlight, patients should wear protective clothing, sunglasses, and sunscreen.  The patient was instructed to call the office immediately if the following severe adverse effects occur:  hearing changes, easy bruising/bleeding, severe headache, or vision changes.  The patient verbalized understanding of the proper use and possible adverse effects of tetracycline.  All of the patient's questions and concerns were addressed. Patient understands to avoid pregnancy while on therapy due to potential birth defects.
Spironolactone Pregnancy And Lactation Text: This medication can cause feminization of the male fetus and should be avoided during pregnancy. The active metabolite is also found in breast milk.
Benzoyl Peroxide Counseling: Patient counseled that medicine may cause skin irritation and bleach clothing.  In the event of skin irritation, the patient was advised to reduce the amount of the drug applied or use it less frequently.   The patient verbalized understanding of the proper use and possible adverse effects of benzoyl peroxide.  All of the patient's questions and concerns were addressed.
Dapsone Pregnancy And Lactation Text: This medication is Pregnancy Category C and is not considered safe during pregnancy or breast feeding.
Sarecycline Counseling: Patient advised regarding possible photosensitivity and discoloration of the teeth, skin, lips, tongue and gums.  Patient instructed to avoid sunlight, if possible.  When exposed to sunlight, patients should wear protective clothing, sunglasses, and sunscreen.  The patient was instructed to call the office immediately if the following severe adverse effects occur:  hearing changes, easy bruising/bleeding, severe headache, or vision changes.  The patient verbalized understanding of the proper use and possible adverse effects of sarecycline.  All of the patient's questions and concerns were addressed.
Topical Sulfur Applications Counseling: Topical Sulfur Counseling: Patient counseled that this medication may cause skin irritation or allergic reactions.  In the event of skin irritation, the patient was advised to reduce the amount of the drug applied or use it less frequently.   The patient verbalized understanding of the proper use and possible adverse effects of topical sulfur application.  All of the patient's questions and concerns were addressed.
Bactrim Counseling:  I discussed with the patient the risks of sulfa antibiotics including but not limited to GI upset, allergic reaction, drug rash, diarrhea, dizziness, photosensitivity, and yeast infections.  Rarely, more serious reactions can occur including but not limited to aplastic anemia, agranulocytosis, methemoglobinemia, blood dyscrasias, liver or kidney failure, lung infiltrates or desquamative/blistering drug rashes.
Bactrim Pregnancy And Lactation Text: This medication is Pregnancy Category D and is known to cause fetal risk.  It is also excreted in breast milk.
Doxycycline Counseling:  Patient counseled regarding possible photosensitivity and increased risk for sunburn.  Patient instructed to avoid sunlight, if possible.  When exposed to sunlight, patients should wear protective clothing, sunglasses, and sunscreen.  The patient was instructed to call the office immediately if the following severe adverse effects occur:  hearing changes, easy bruising/bleeding, severe headache, or vision changes.  The patient verbalized understanding of the proper use and possible adverse effects of doxycycline.  All of the patient's questions and concerns were addressed.
Erythromycin Pregnancy And Lactation Text: This medication is Pregnancy Category B and is considered safe during pregnancy. It is also excreted in breast milk.
Tazorac Pregnancy And Lactation Text: This medication is not safe during pregnancy. It is unknown if this medication is excreted in breast milk.
Topical Retinoid counseling:  Patient advised to apply a pea-sized amount only at bedtime and wait 30 minutes after washing their face before applying.  If too drying, patient may add a non-comedogenic moisturizer. The patient verbalized understanding of the proper use and possible adverse effects of retinoids.  All of the patient's questions and concerns were addressed.
Azithromycin Pregnancy And Lactation Text: This medication is considered safe during pregnancy and is also secreted in breast milk.
Birth Control Pills Pregnancy And Lactation Text: This medication should be avoided if pregnant and for the first 30 days post-partum.
Benzoyl Peroxide Pregnancy And Lactation Text: This medication is Pregnancy Category C. It is unknown if benzoyl peroxide is excreted in breast milk.
High Dose Vitamin A Counseling: Side effects reviewed, pt to contact office should one occur.
Topical Sulfur Applications Pregnancy And Lactation Text: This medication is Pregnancy Category C and has an unknown safety profile during pregnancy. It is unknown if this topical medication is excreted in breast milk.
Topical Clindamycin Counseling: Patient counseled that this medication may cause skin irritation or allergic reactions.  In the event of skin irritation, the patient was advised to reduce the amount of the drug applied or use it less frequently.   The patient verbalized understanding of the proper use and possible adverse effects of clindamycin.  All of the patient's questions and concerns were addressed.
Topical Clindamycin Pregnancy And Lactation Text: This medication is Pregnancy Category B and is considered safe during pregnancy. It is unknown if it is excreted in breast milk.
Topical Retinoid Pregnancy And Lactation Text: This medication is Pregnancy Category C. It is unknown if this medication is excreted in breast milk.
Erythromycin Counseling:  I discussed with the patient the risks of erythromycin including but not limited to GI upset, allergic reaction, drug rash, diarrhea, increase in liver enzymes, and yeast infections.
Detail Level: Generalized

## 2022-12-19 ENCOUNTER — OFFICE VISIT (OUTPATIENT)
Dept: UROLOGY | Age: 22
End: 2022-12-19
Payer: COMMERCIAL

## 2022-12-19 DIAGNOSIS — N40.0 BENIGN PROSTATIC HYPERPLASIA WITHOUT LOWER URINARY TRACT SYMPTOMS: ICD-10-CM

## 2022-12-19 DIAGNOSIS — N31.9 NEUROMUSCULAR DYSFUNCTION OF BLADDER, UNSPECIFIED: Primary | ICD-10-CM

## 2022-12-19 PROCEDURE — 99214 OFFICE O/P EST MOD 30 MIN: CPT | Performed by: UROLOGY

## 2022-12-19 ASSESSMENT — ENCOUNTER SYMPTOMS
NAUSEA: 0
BACK PAIN: 0

## 2022-12-19 NOTE — PROGRESS NOTES
[facility]  [address], [address] [facility]  [phone]    Ld Powell  : 2000    Chief Complaint   Patient presents with    Follow-up          HPI     Ld Powell is a 25 y.o.  male with a PMH of spina bifida and neurogenic bladder on CIC 4-5 times daily who returns for follow up. More recently, he has had difficulty with self cathing and saw blood in 10/2022. Cysto showed false passage which was treated with urethral rest for 1 week. He has been cathing again on his own ever since without difficulty. He has a history of bladder augmentation for a small capacity bladder with high bladder pressures in  and has done well since that time. He has not had post-op urodynamics since the augmentation but had a renal US in 2021 which showed NO hydronephrosis or other upper tract abnormalities. He does have some mild leaking between caths that requires 1-2 PPD but this is not bothersome to him. Has tried ditropan in the past but did not like side effects, so stopped. No UTIs since his augmentation. No gross hematuria. He is not on any bladder medications at this time. As for his bowels, he uses a daily irrigation program for daily bowel movements and has done well with that. Urodynamics were attempted to get a baseline in  but aborted due to difficult anatomy that prevented passage of urodynamics catheter. History reviewed. No pertinent past medical history. History reviewed. No pertinent surgical history. Current Outpatient Medications   Medication Sig Dispense Refill    sodium chloride 0.9 % irrigation Irrigation route daily for 30 doses.  Via rosales catheter 6000 mL 11    acetaminophen (TYLENOL) 325 MG tablet Take 325 mg by mouth as needed      cetirizine (ZYRTEC) 10 MG tablet Take by mouth      vitamin D (CHOLECALCIFEROL) 25 MCG (1000 UT) TABS tablet Take 1,000 Units by mouth daily lactobacillus (CULTURELLE) CAPS capsule Take 1 capsule by mouth daily      RABEprazole (ACIPHEX) 20 MG tablet TAKE ONE TABLET BY MOUTH ONE TIME DAILY       No current facility-administered medications for this visit. Allergies   Allergen Reactions    Latex Other (See Comments)     Social History     Socioeconomic History    Marital status: Single     Spouse name: Not on file    Number of children: Not on file    Years of education: Not on file    Highest education level: Not on file   Occupational History    Not on file   Tobacco Use    Smoking status: Not on file    Smokeless tobacco: Not on file   Substance and Sexual Activity    Alcohol use: Not on file    Drug use: Not on file    Sexual activity: Not on file   Other Topics Concern    Not on file   Social History Narrative    Not on file     Social Determinants of Health     Financial Resource Strain: Not on file   Food Insecurity: Not on file   Transportation Needs: Not on file   Physical Activity: Not on file   Stress: Not on file   Social Connections: Not on file   Intimate Partner Violence: Not on file   Housing Stability: Not on file     History reviewed. No pertinent family history. Review of Systems  Constitutional:   Negative for fever, chills, appetite change, malaise/fatigue, headaches and weight loss. Skin:  Negative for skin lesions, rash and itching. Eyes:  Negative for visual disturbance, eye pain and eye discharge. ENT:  Negative for difficulty articulating words, pain swallowing, high frequency hearing loss and dry mouth. Respiratory:  Negative for cough, blood in sputum, shortness of breath and wheezing. Cardiovascular:  Negative for chest pain, hypertension, irregular heartbeat, leg pain, leg swelling, regular rate and rhythm and varicose veins. GI:  Negative for nausea, vomiting, abdominal pain, blood in stool, constipation, diarrhea, indigestion and heartburn.   Genitourinary:  Negative for urinary burning, hematuria, flank pain, recurrent UTIs, history of urolithiasis, nocturia, slower stream, straining, urgency, leakage w/ urge, frequent urination, incomplete emptying, erectile dysfunction, testicular pain, sexually transmitted disease, discharge and urethral stricture. Musculoskeletal:  Negative for back pain, bone pain, arthralgias, tenderness, muscle weakness and neck pain. Neurological:  Negative for dizziness, focal weakness, numbness, seizures and tremors. Psychological:  Negative for depression and psychiatric problem. Endocrine:  Negative for cold intolerance, thirst, excessive urination, fatigue and heat intolerance. Hem/Lymphatic:  Negative for easy bleeding, easy bruising and frequent infections. Urinalysis  UA - Dipstick  Results for orders placed or performed in visit on 10/04/22   AMB POC URINALYSIS DIP STICK AUTO W/O MICRO   Result Value Ref Range    Color (UA POC)      Clarity (UA POC)      Glucose, Urine, POC Negative Negative    Bilirubin, Urine, POC Negative Negative    KETONES, Urine, POC Negative Negative    Specific Gravity, Urine, POC 1.010 1.001 - 1.035    Blood (UA POC) Large Negative    pH, Urine, POC 7.0 4.6 - 8.0    Protein, Urine, POC 30 Negative    Urobilinogen, POC 2 mg/dL     Nitrite, Urine, POC Negative Negative    Leukocyte Esterase, Urine, POC Small Negative       UA - Micro  WBC - 0  RBC - 0  Bacteria - 0  Epith - 0    There were no vitals taken for this visit. GENERAL: No acute distress, Awake, Alert, Oriented X 3, Gait normal  CARDIAC: regular rate and rhythm  CHEST AND LUNG: Easy work of breathing, clear to auscultation bilaterally, no cyanosis  ABDOMEN: soft, non tender, non-distended, positive bowel sounds, no organomegaly, no palpable masses, no guarding, no rebound tenderness  SKIN: No rash, no erythema, no lacerations or abrasions, no ecchymosis  NEUROLOGIC: cranial nerves 2-12 grossly intact         Assessment and Plan    ICD-10-CM    1.  Neuromuscular dysfunction of bladder, unspecified  N31.9 US RETROPERITONEAL COMPLETE     CANCELED: AMB POC URINALYSIS DIP STICK AUTO W/O MICRO      2. Benign prostatic hyperplasia without lower urinary tract symptoms  N40.0 CANCELED: AMB POC URINALYSIS DIP STICK AUTO W/O MICRO        Continue CIC regimen 5 times daily. Still leaks between caths but wants to hold on ditropan due to side effects. Follow up 1 year. Just had cysto 10/2022 for false passage work up so will hold on that next year but will get renal / bladder US in  year to survey upper tracts. Follow up sooner if needed    I have spent 30 minutes today reviewing previous notes, test results and face to face with the patient as well as documenting. Bennie Nye M.D.     [facility]  [address]  Phone: [phone]  Fax: [phone]

## 2022-12-20 ASSESSMENT — ENCOUNTER SYMPTOMS
VOMITING: 0
SKIN LESIONS: 0
EYE DISCHARGE: 0
SHORTNESS OF BREATH: 0
INDIGESTION: 0
ABDOMINAL PAIN: 0
HEARTBURN: 0
CONSTIPATION: 0
EYE PAIN: 0
COUGH: 0
WHEEZING: 0
DIARRHEA: 0
BLOOD IN STOOL: 0

## 2023-04-25 ENCOUNTER — TELEPHONE (OUTPATIENT)
Dept: UROLOGY | Age: 23
End: 2023-04-25

## 2023-05-01 NOTE — TELEPHONE ENCOUNTER
Scheduled pt for follow up appt to get order for supplies sent and will start having yearly follow ups around 1 st week of march.

## 2023-05-09 RX ORDER — CIPROFLOXACIN 500 MG/1
500 TABLET, FILM COATED ORAL 2 TIMES DAILY
Qty: 14 TABLET | Refills: 0 | Status: SHIPPED | OUTPATIENT
Start: 2023-05-09 | End: 2023-05-16

## 2023-05-09 NOTE — PROGRESS NOTES
There were issues last night with I/O cath. There was a lot of bleeding and resistance when cath was inserted. His parents called to ask what next step would be. They had indwelling rosales catheter and I instructed them to insert. They were able to place catheter with little effort. The bleeding stopped and the urine has been clear. Per Dr. Parisi Service pt will keep catheter for 7 days then it can be removed and they can resume I/O. The mother called this AM to inform that the pt had temp of 99.1I have sent in Cipro to pharmacy and have advised her to use tylenol for temp.

## 2023-05-17 ENCOUNTER — OFFICE VISIT (OUTPATIENT)
Dept: UROLOGY | Age: 23
End: 2023-05-17
Payer: COMMERCIAL

## 2023-05-17 DIAGNOSIS — R33.9 URINARY RETENTION: ICD-10-CM

## 2023-05-17 DIAGNOSIS — N31.9 NEUROGENIC BLADDER: Primary | ICD-10-CM

## 2023-05-17 DIAGNOSIS — N36.5 FALSE PASSAGE OF URETHRA: ICD-10-CM

## 2023-05-17 PROCEDURE — 99214 OFFICE O/P EST MOD 30 MIN: CPT | Performed by: NURSE PRACTITIONER

## 2023-05-17 ASSESSMENT — ENCOUNTER SYMPTOMS
NAUSEA: 0
BACK PAIN: 0

## 2023-05-17 NOTE — PROGRESS NOTES
[facility]  [address] [facility]    [address] [place] [address], [address] [facility] [place]  [phone]          Herrera Dickens  : 2000    Chief Complaint   Patient presents with    Follow-up          HPI     Herrera Dickens is a 25 y.o. male with a PMH of spina bifida and neurogenic bladder on CIC 4-5 times daily who returns for follow up. He has had difficulty with self cathing in the past and saw blood in 10/2022. Cysto showed false passage which was treated with urethral rest for 1 week. Did well after this. He had recurrent of cathing difficulties almost 10 days ago. His father was able to place indwelling Rosales cath. This remains today. He has a history of bladder augmentation for a small capacity bladder with high bladder pressures in  and has done well since that time. He has not had post-op urodynamics since the augmentation but had a renal US in 2021 which showed NO hydronephrosis or other upper tract abnormalities. He does have some mild leaking between caths that requires 1-2 PPD but this is not bothersome to him. Has tried ditropan in the past but did not like side effects, so stopped. No UTIs since his augmentation. No gross hematuria. He is not on any bladder medications at this time. As for his bowels, he uses a daily irrigation program for daily bowel movements and has done well with that. Urodynamics were attempted to get a baseline in  but aborted due to difficult anatomy that prevented passage of urodynamics catheter. Accompanied by his mother and father today. History reviewed. No pertinent past medical history. History reviewed. No pertinent surgical history. Current Outpatient Medications   Medication Sig Dispense Refill    sodium chloride 0.9 % irrigation Irrigation route daily for 30 doses.  Via rosales catheter 6000 mL 11    acetaminophen (TYLENOL) 325 MG tablet Take 1 tablet by mouth as needed      cetirizine (ZYRTEC) 10 MG

## 2023-11-06 ENCOUNTER — HOSPITAL ENCOUNTER (OUTPATIENT)
Dept: ULTRASOUND IMAGING | Age: 23
Discharge: HOME | End: 2023-11-08
Payer: COMMERCIAL

## 2023-11-06 DIAGNOSIS — N31.9 NEUROMUSCULAR DYSFUNCTION OF BLADDER, UNSPECIFIED: ICD-10-CM

## 2023-11-06 PROCEDURE — 76770 US EXAM ABDO BACK WALL COMP: CPT

## 2023-11-06 NOTE — RESULT ENCOUNTER NOTE
Esme Pop,     Please let Mr. Maddi Woodruff know that his kidney ultrasound shows NO swelling on his kidneys and no abnormalities. I recommend that he keep his follow up with me as scheduled. Thanks!   Alyce

## 2023-11-16 ENCOUNTER — APPOINTMENT (RX ONLY)
Dept: URBAN - METROPOLITAN AREA CLINIC 330 | Facility: CLINIC | Age: 23
Setting detail: DERMATOLOGY
End: 2023-11-16

## 2023-11-16 DIAGNOSIS — L70.0 ACNE VULGARIS: ICD-10-CM | Status: WELL CONTROLLED

## 2023-11-16 DIAGNOSIS — D22 MELANOCYTIC NEVI: ICD-10-CM | Status: STABLE

## 2023-11-16 PROBLEM — D22.72 MELANOCYTIC NEVI OF LEFT LOWER LIMB, INCLUDING HIP: Status: ACTIVE | Noted: 2023-11-16

## 2023-11-16 PROBLEM — D22.71 MELANOCYTIC NEVI OF RIGHT LOWER LIMB, INCLUDING HIP: Status: ACTIVE | Noted: 2023-11-16

## 2023-11-16 PROBLEM — D22.4 MELANOCYTIC NEVI OF SCALP AND NECK: Status: ACTIVE | Noted: 2023-11-16

## 2023-11-16 PROBLEM — D22.5 MELANOCYTIC NEVI OF TRUNK: Status: ACTIVE | Noted: 2023-11-16

## 2023-11-16 PROCEDURE — ? PRESCRIPTION MEDICATION MANAGEMENT

## 2023-11-16 PROCEDURE — ? BODY PHOTOGRAPHY

## 2023-11-16 PROCEDURE — ? OTHER

## 2023-11-16 PROCEDURE — ? COUNSELING

## 2023-11-16 PROCEDURE — 99213 OFFICE O/P EST LOW 20 MIN: CPT

## 2023-11-16 PROCEDURE — ? MDM - TREATMENT GOALS

## 2023-11-16 PROCEDURE — ? FULL BODY SKIN EXAM

## 2023-11-16 PROCEDURE — ? PRESCRIPTION

## 2023-11-16 RX ORDER — CLINDAMYCIN PHOSPHATE 10 MG/G
GEL TOPICAL
Qty: 60 | Refills: 4 | Status: ERX

## 2023-11-16 RX ORDER — ADAPALENE AND BENZOYL PEROXIDE 1; 25 MG/G; MG/G
GEL TOPICAL
Qty: 45 | Refills: 3 | Status: ERX

## 2023-11-16 ASSESSMENT — LOCATION ZONE DERM
LOCATION ZONE: LEG
LOCATION ZONE: TRUNK
LOCATION ZONE: FACE
LOCATION ZONE: SCALP

## 2023-11-16 ASSESSMENT — LOCATION DETAILED DESCRIPTION DERM
LOCATION DETAILED: LEFT SUPERIOR OCCIPITAL SCALP
LOCATION DETAILED: PERIUMBILICAL SKIN
LOCATION DETAILED: LEFT INFERIOR CENTRAL MALAR CHEEK
LOCATION DETAILED: INFERIOR MID FOREHEAD
LOCATION DETAILED: LEFT INFERIOR MEDIAL UPPER BACK
LOCATION DETAILED: LEFT PROXIMAL POSTERIOR THIGH
LOCATION DETAILED: LEFT SUPERIOR MEDIAL UPPER BACK
LOCATION DETAILED: RIGHT SUPERIOR MEDIAL MIDBACK
LOCATION DETAILED: RIGHT PROXIMAL PRETIBIAL REGION
LOCATION DETAILED: LEFT DISTAL PRETIBIAL REGION

## 2023-11-16 ASSESSMENT — LOCATION SIMPLE DESCRIPTION DERM
LOCATION SIMPLE: ABDOMEN
LOCATION SIMPLE: POSTERIOR SCALP
LOCATION SIMPLE: LEFT POSTERIOR THIGH
LOCATION SIMPLE: LEFT UPPER BACK
LOCATION SIMPLE: LEFT CHEEK
LOCATION SIMPLE: RIGHT LOWER BACK
LOCATION SIMPLE: LEFT PRETIBIAL REGION
LOCATION SIMPLE: INFERIOR FOREHEAD
LOCATION SIMPLE: RIGHT PRETIBIAL REGION

## 2023-11-16 NOTE — PROCEDURE: PRESCRIPTION MEDICATION MANAGEMENT
Render In Strict Bullet Format?: No
Continue Regimen: Epiduo forte pea sized amount to entire face once nightly \\n\\nWash face twice daily with gentle cleanser\\n\\nClindamycin gel apply to area every morning
Detail Level: Zone
Plan: Patient is not taking Minocycline 100mg once daily  for a week or two when flared

## 2023-11-16 NOTE — PROCEDURE: MIPS QUALITY
Quality 130: Documentation Of Current Medications In The Medical Record: Current Medications Documented
Quality 402: Tobacco Use And Help With Quitting Among Adolescents: Patient screened for tobacco and never smoked
Quality 226: Preventive Care And Screening: Tobacco Use: Screening And Cessation Intervention: Patient screened for tobacco use and is an ex/non-smoker
Quality 431: Preventive Care And Screening: Unhealthy Alcohol Use - Screening: Patient not identified as an unhealthy alcohol user when screened for unhealthy alcohol use using a systematic screening method
Detail Level: Detailed

## 2023-11-16 NOTE — PROCEDURE: COUNSELING
Tazorac Counseling:  Patient advised that medication is irritating and drying.  Patient may need to apply sparingly and wash off after an hour before eventually leaving it on overnight.  The patient verbalized understanding of the proper use and possible adverse effects of tazorac.  All of the patient's questions and concerns were addressed.
Sarecycline Pregnancy And Lactation Text: This medication is Pregnancy Category D and not consider safe during pregnancy. It is also excreted in breast milk.
Spironolactone Counseling: Patient advised regarding risks of diarrhea, abdominal pain, hyperkalemia, birth defects (for female patients), liver toxicity and renal toxicity. The patient may need blood work to monitor liver and kidney function and potassium levels while on therapy. The patient verbalized understanding of the proper use and possible adverse effects of spironolactone.  All of the patient's questions and concerns were addressed.
Isotretinoin Pregnancy And Lactation Text: This medication is Pregnancy Category X and is considered extremely dangerous during pregnancy. It is unknown if it is excreted in breast milk.
Use Enhanced Medication Counseling?: No
Doxycycline Pregnancy And Lactation Text: This medication is Pregnancy Category D and not consider safe during pregnancy. It is also excreted in breast milk but is considered safe for shorter treatment courses.
Minocycline Counseling: Patient advised regarding possible photosensitivity and discoloration of the teeth, skin, lips, tongue and gums.  Patient instructed to avoid sunlight, if possible.  When exposed to sunlight, patients should wear protective clothing, sunglasses, and sunscreen.  The patient was instructed to call the office immediately if the following severe adverse effects occur:  hearing changes, easy bruising/bleeding, severe headache, or vision changes.  The patient verbalized understanding of the proper use and possible adverse effects of minocycline.  All of the patient's questions and concerns were addressed.
Azithromycin Counseling:  I discussed with the patient the risks of azithromycin including but not limited to GI upset, allergic reaction, drug rash, diarrhea, and yeast infections.
Isotretinoin Counseling: Patient should get monthly blood tests, not donate blood, not drive at night if vision affected, not share medication, and not undergo elective surgery for 6 months after tx completed. Side effects reviewed, pt to contact office should one occur.
Detail Level: Zone
Dapsone Counseling: I discussed with the patient the risks of dapsone including but not limited to hemolytic anemia, agranulocytosis, rashes, methemoglobinemia, kidney failure, peripheral neuropathy, headaches, GI upset, and liver toxicity.  Patients who start dapsone require monitoring including baseline LFTs and weekly CBCs for the first month, then every month thereafter.  The patient verbalized understanding of the proper use and possible adverse effects of dapsone.  All of the patient's questions and concerns were addressed.
High Dose Vitamin A Pregnancy And Lactation Text: High dose vitamin A therapy is contraindicated during pregnancy and breast feeding.
Birth Control Pills Counseling: Birth Control Pill Counseling: I discussed with the patient the potential side effects of OCPs including but not limited to increased risk of stroke, heart attack, thrombophlebitis, deep venous thrombosis, hepatic adenomas, breast changes, GI upset, headaches, and depression.  The patient verbalized understanding of the proper use and possible adverse effects of OCPs. All of the patient's questions and concerns were addressed.
Tetracycline Counseling: Patient counseled regarding possible photosensitivity and increased risk for sunburn.  Patient instructed to avoid sunlight, if possible.  When exposed to sunlight, patients should wear protective clothing, sunglasses, and sunscreen.  The patient was instructed to call the office immediately if the following severe adverse effects occur:  hearing changes, easy bruising/bleeding, severe headache, or vision changes.  The patient verbalized understanding of the proper use and possible adverse effects of tetracycline.  All of the patient's questions and concerns were addressed. Patient understands to avoid pregnancy while on therapy due to potential birth defects.
Spironolactone Pregnancy And Lactation Text: This medication can cause feminization of the male fetus and should be avoided during pregnancy. The active metabolite is also found in breast milk.
Benzoyl Peroxide Counseling: Patient counseled that medicine may cause skin irritation and bleach clothing.  In the event of skin irritation, the patient was advised to reduce the amount of the drug applied or use it less frequently.   The patient verbalized understanding of the proper use and possible adverse effects of benzoyl peroxide.  All of the patient's questions and concerns were addressed.
Dapsone Pregnancy And Lactation Text: This medication is Pregnancy Category C and is not considered safe during pregnancy or breast feeding.
Sarecycline Counseling: Patient advised regarding possible photosensitivity and discoloration of the teeth, skin, lips, tongue and gums.  Patient instructed to avoid sunlight, if possible.  When exposed to sunlight, patients should wear protective clothing, sunglasses, and sunscreen.  The patient was instructed to call the office immediately if the following severe adverse effects occur:  hearing changes, easy bruising/bleeding, severe headache, or vision changes.  The patient verbalized understanding of the proper use and possible adverse effects of sarecycline.  All of the patient's questions and concerns were addressed.
Topical Sulfur Applications Counseling: Topical Sulfur Counseling: Patient counseled that this medication may cause skin irritation or allergic reactions.  In the event of skin irritation, the patient was advised to reduce the amount of the drug applied or use it less frequently.   The patient verbalized understanding of the proper use and possible adverse effects of topical sulfur application.  All of the patient's questions and concerns were addressed.
Bactrim Counseling:  I discussed with the patient the risks of sulfa antibiotics including but not limited to GI upset, allergic reaction, drug rash, diarrhea, dizziness, photosensitivity, and yeast infections.  Rarely, more serious reactions can occur including but not limited to aplastic anemia, agranulocytosis, methemoglobinemia, blood dyscrasias, liver or kidney failure, lung infiltrates or desquamative/blistering drug rashes.
Bactrim Pregnancy And Lactation Text: This medication is Pregnancy Category D and is known to cause fetal risk.  It is also excreted in breast milk.
Doxycycline Counseling:  Patient counseled regarding possible photosensitivity and increased risk for sunburn.  Patient instructed to avoid sunlight, if possible.  When exposed to sunlight, patients should wear protective clothing, sunglasses, and sunscreen.  The patient was instructed to call the office immediately if the following severe adverse effects occur:  hearing changes, easy bruising/bleeding, severe headache, or vision changes.  The patient verbalized understanding of the proper use and possible adverse effects of doxycycline.  All of the patient's questions and concerns were addressed.
Erythromycin Pregnancy And Lactation Text: This medication is Pregnancy Category B and is considered safe during pregnancy. It is also excreted in breast milk.
Tazorac Pregnancy And Lactation Text: This medication is not safe during pregnancy. It is unknown if this medication is excreted in breast milk.
Topical Retinoid counseling:  Patient advised to apply a pea-sized amount only at bedtime and wait 30 minutes after washing their face before applying.  If too drying, patient may add a non-comedogenic moisturizer. The patient verbalized understanding of the proper use and possible adverse effects of retinoids.  All of the patient's questions and concerns were addressed.
Azithromycin Pregnancy And Lactation Text: This medication is considered safe during pregnancy and is also secreted in breast milk.
Birth Control Pills Pregnancy And Lactation Text: This medication should be avoided if pregnant and for the first 30 days post-partum.
Benzoyl Peroxide Pregnancy And Lactation Text: This medication is Pregnancy Category C. It is unknown if benzoyl peroxide is excreted in breast milk.
High Dose Vitamin A Counseling: Side effects reviewed, pt to contact office should one occur.
Topical Sulfur Applications Pregnancy And Lactation Text: This medication is Pregnancy Category C and has an unknown safety profile during pregnancy. It is unknown if this topical medication is excreted in breast milk.
Topical Clindamycin Counseling: Patient counseled that this medication may cause skin irritation or allergic reactions.  In the event of skin irritation, the patient was advised to reduce the amount of the drug applied or use it less frequently.   The patient verbalized understanding of the proper use and possible adverse effects of clindamycin.  All of the patient's questions and concerns were addressed.
Topical Clindamycin Pregnancy And Lactation Text: This medication is Pregnancy Category B and is considered safe during pregnancy. It is unknown if it is excreted in breast milk.
Topical Retinoid Pregnancy And Lactation Text: This medication is Pregnancy Category C. It is unknown if this medication is excreted in breast milk.
Erythromycin Counseling:  I discussed with the patient the risks of erythromycin including but not limited to GI upset, allergic reaction, drug rash, diarrhea, increase in liver enzymes, and yeast infections.
Detail Level: Generalized
Winlevi Counseling:  I discussed with the patient the risks of topical clascoterone including but not limited to erythema, scaling, itching, and stinging. Patient voiced their understanding.
Aklief counseling:  Patient advised to apply a pea-sized amount only at bedtime and wait 30 minutes after washing their face before applying.  If too drying, patient may add a non-comedogenic moisturizer.  The most commonly reported side effects including irritation, redness, scaling, dryness, stinging, burning, itching, and increased risk of sunburn.  The patient verbalized understanding of the proper use and possible adverse effects of retinoids.  All of the patient's questions and concerns were addressed.
Azelaic Acid Pregnancy And Lactation Text: This medication is considered safe during pregnancy and breast feeding.
Winlevi Pregnancy And Lactation Text: This medication is considered safe during pregnancy and breastfeeding.
Aklief Pregnancy And Lactation Text: It is unknown if this medication is safe to use during pregnancy.  It is unknown if this medication is excreted in breast milk.  Breastfeeding women should use the topical cream on the smallest area of the skin for the shortest time needed while breastfeeding.  Do not apply to nipple and areola.
Azelaic Acid Counseling: Patient counseled that medicine may cause skin irritation and to avoid applying near the eyes.  In the event of skin irritation, the patient was advised to reduce the amount of the drug applied or use it less frequently.   The patient verbalized understanding of the proper use and possible adverse effects of azelaic acid.  All of the patient's questions and concerns were addressed.

## 2023-11-16 NOTE — PROCEDURE: BODY PHOTOGRAPHY
Whole Body Statement: The whole body was photographed today.
Consent was obtained for whole body photography. Patient understands that photograph costs may not be covered by insurance.
Reason For Photography: The patient is obtaining whole body photography to observe existing suspicious moles and or monitor for the appearance of any new lesions.
Detail Level: Detailed
Number Of Photographs (Optional- Will Not Render If 0): 6
Was The Entire Body Photographed (Cannot Bill Unless Entire Body Photographed)?: Please Select Yes or No - If you select Yes you are confirming that you took full body photographs

## 2024-03-04 ENCOUNTER — OFFICE VISIT (OUTPATIENT)
Dept: UROLOGY | Age: 24
End: 2024-03-04
Payer: COMMERCIAL

## 2024-03-04 ENCOUNTER — TELEPHONE (OUTPATIENT)
Dept: UROLOGY | Age: 24
End: 2024-03-04

## 2024-03-04 DIAGNOSIS — R33.9 URINARY RETENTION: Primary | ICD-10-CM

## 2024-03-04 DIAGNOSIS — N31.9 NEUROGENIC BLADDER: ICD-10-CM

## 2024-03-04 PROCEDURE — 99213 OFFICE O/P EST LOW 20 MIN: CPT | Performed by: NURSE PRACTITIONER

## 2024-03-04 RX ORDER — MAGNESIUM HYDROXIDE 1200 MG/15ML
LIQUID ORAL
Qty: 6000 ML | Refills: 11 | Status: SHIPPED | OUTPATIENT
Start: 2024-03-04

## 2024-03-04 ASSESSMENT — ENCOUNTER SYMPTOMS
BACK PAIN: 0
NAUSEA: 0

## 2024-03-04 NOTE — TELEPHONE ENCOUNTER
Please contact [facility]. CIC 6x/day w Coloplast 14 fr flex coude pro. He states he has been getting cure catheters.    Carol Alex, MALIA - CNP

## 2024-06-10 ENCOUNTER — TELEPHONE (OUTPATIENT)
Dept: UROLOGY | Age: 24
End: 2024-06-10

## 2024-06-10 NOTE — TELEPHONE ENCOUNTER
Patient's mom said she left list with Alis. If so, could she provide this information. If not, please obtain from pt's mom.    MALIA Price - CNP

## 2024-06-10 NOTE — TELEPHONE ENCOUNTER
Pt's mother called to get the transfer over to [facility], and I spoke with Alis at [facility] she stated that we need to send over a prescription with the list of supplies that he needs. Please advise.

## 2024-11-13 RX ORDER — CIPROFLOXACIN 500 MG/1
500 TABLET, FILM COATED ORAL 2 TIMES DAILY
Qty: 14 TABLET | Refills: 0 | Status: SHIPPED | OUTPATIENT
Start: 2024-11-13 | End: 2024-11-20

## 2024-11-13 NOTE — PROGRESS NOTES
Pt mother called to say pt was having issues with cathing. He started having urethral bleeding likely due to false passage. His father helped him place indwelling catheter and it is draining well. Pt is feeling poorly and has chills last night. Antibiotic has been sent in for him to use. If no improvement he will come in for office visit.     Orders Placed This Encounter    ciprofloxacin (CIPRO) 500 MG tablet     Sig: Take 1 tablet by mouth 2 times daily for 7 days     Dispense:  14 tablet     Refill:  0

## 2024-11-21 ENCOUNTER — TELEPHONE (OUTPATIENT)
Dept: UROLOGY | Age: 24
End: 2024-11-21

## 2024-11-21 ENCOUNTER — PROCEDURE VISIT (OUTPATIENT)
Dept: UROLOGY | Age: 24
End: 2024-11-21

## 2024-11-21 DIAGNOSIS — N31.9 NEUROGENIC BLADDER: Primary | ICD-10-CM

## 2024-11-21 DIAGNOSIS — N36.5 URETHRAL FALSE PASSAGE: ICD-10-CM

## 2024-11-21 NOTE — TELEPHONE ENCOUNTER
Patient attempted catheterization last week but was unable to pass the catheter.  He created a lot of bleeding.  It was recommended that they put in a regular Calles catheter and let his urethral rest for a week.  They did so.  They remove the catheter Monday but have continued to have issues with in and out caths.  He had a good catheterization around 830 last night but has not been able to get a catheter in since.  Both his mother and father have tried with no success.    Discussed with jarrett in [place] office.  Dr. Mead has a 2:30 time slot for possible cystoscope.  I will place him in that time slot for cystoscopy and catheter placement.

## 2024-11-21 NOTE — PROGRESS NOTES
tablet TAKE ONE TABLET BY MOUTH ONE TIME DAILY       No current facility-administered medications for this visit.     Allergies   Allergen Reactions    Latex Other (See Comments)     Social History     Socioeconomic History    Marital status: Single     Spouse name: Not on file    Number of children: Not on file    Years of education: Not on file    Highest education level: Not on file   Occupational History    Not on file   Tobacco Use    Smoking status: Never    Smokeless tobacco: Never   Substance and Sexual Activity    Alcohol use: Not on file    Drug use: Not on file    Sexual activity: Not on file   Other Topics Concern    Not on file   Social History Narrative    Not on file     Social Determinants of Health     Financial Resource Strain: Not on file   Food Insecurity: Not on file   Transportation Needs: Not on file   Physical Activity: Not on file   Stress: Not on file   Social Connections: Unknown (3/19/2021)    Received from [facility]    Social Connections     Frequency of Communication with Friends and Family: Not asked     Frequency of Social Gatherings with Friends and Family: Not asked   Intimate Partner Violence: Unknown (3/19/2021)    Received from [facility]    Intimate Partner Violence     Fear of Current or Ex-Partner: Not asked     Emotionally Abused: Not asked     Physically Abused: Not asked     Sexually Abused: Not asked   Housing Stability: Not At Risk (3/9/2022)    Received from [facility]    Housing Stability     Was there a time when you did not have a steady place to sleep: Not asked     Worried that the place you are staying is making you sick: Not asked     No family history on file.    There were no vitals taken for this visit.    UA - Dipstick  Results for orders placed or performed in visit on 10/04/22   AMB POC URINALYSIS DIP STICK AUTO W/O MICRO    Collection Time: 10/04/22  3:08 PM   Result Value Ref Range    Color (UA POC)

## 2024-11-26 ENCOUNTER — APPOINTMENT (RX ONLY)
Dept: URBAN - METROPOLITAN AREA CLINIC 330 | Facility: CLINIC | Age: 24
Setting detail: DERMATOLOGY
End: 2024-11-26

## 2024-11-26 ENCOUNTER — TELEPHONE (OUTPATIENT)
Dept: UROLOGY | Age: 24
End: 2024-11-26

## 2024-11-26 DIAGNOSIS — L70.0 ACNE VULGARIS: ICD-10-CM | Status: WELL CONTROLLED

## 2024-11-26 DIAGNOSIS — D22 MELANOCYTIC NEVI: ICD-10-CM

## 2024-11-26 PROBLEM — D22.71 MELANOCYTIC NEVI OF RIGHT LOWER LIMB, INCLUDING HIP: Status: ACTIVE | Noted: 2024-11-26

## 2024-11-26 PROBLEM — D22.5 MELANOCYTIC NEVI OF TRUNK: Status: ACTIVE | Noted: 2024-11-26

## 2024-11-26 PROBLEM — D22.62 MELANOCYTIC NEVI OF LEFT UPPER LIMB, INCLUDING SHOULDER: Status: ACTIVE | Noted: 2024-11-26

## 2024-11-26 PROBLEM — D22.61 MELANOCYTIC NEVI OF RIGHT UPPER LIMB, INCLUDING SHOULDER: Status: ACTIVE | Noted: 2024-11-26

## 2024-11-26 PROBLEM — D22.72 MELANOCYTIC NEVI OF LEFT LOWER LIMB, INCLUDING HIP: Status: ACTIVE | Noted: 2024-11-26

## 2024-11-26 PROCEDURE — ? MEDICAL PHOTOGRAPHY REVIEW

## 2024-11-26 PROCEDURE — ? PRESCRIPTION

## 2024-11-26 PROCEDURE — ? COUNSELING

## 2024-11-26 PROCEDURE — ? OTHER

## 2024-11-26 PROCEDURE — ? MDM - TREATMENT GOALS

## 2024-11-26 PROCEDURE — 99213 OFFICE O/P EST LOW 20 MIN: CPT

## 2024-11-26 PROCEDURE — ? PRESCRIPTION MEDICATION MANAGEMENT

## 2024-11-26 PROCEDURE — ? PHOTO-DOCUMENTATION

## 2024-11-26 RX ORDER — ADAPALENE AND BENZOYL PEROXIDE 1; 25 MG/G; MG/G
GEL TOPICAL
Qty: 45 | Refills: 3 | Status: ERX

## 2024-11-26 RX ORDER — CLINDAMYCIN PHOSPHATE 10 MG/G
GEL TOPICAL
Qty: 60 | Refills: 4 | Status: ERX

## 2024-11-26 ASSESSMENT — LOCATION DETAILED DESCRIPTION DERM
LOCATION DETAILED: LEFT DISTAL PRETIBIAL REGION
LOCATION DETAILED: LEFT SUPERIOR MEDIAL UPPER BACK
LOCATION DETAILED: RIGHT DISTAL PRETIBIAL REGION
LOCATION DETAILED: LEFT INFERIOR CENTRAL MALAR CHEEK
LOCATION DETAILED: LEFT PROXIMAL DORSAL FOREARM
LOCATION DETAILED: RIGHT SUPERIOR MEDIAL LOWER BACK
LOCATION DETAILED: RIGHT MEDIAL UPPER BACK
LOCATION DETAILED: INFERIOR MID FOREHEAD
LOCATION DETAILED: RIGHT PROXIMAL DORSAL FOREARM

## 2024-11-26 ASSESSMENT — LOCATION SIMPLE DESCRIPTION DERM
LOCATION SIMPLE: RIGHT FOREARM
LOCATION SIMPLE: LEFT FOREARM
LOCATION SIMPLE: RIGHT LOWER BACK
LOCATION SIMPLE: RIGHT UPPER BACK
LOCATION SIMPLE: INFERIOR FOREHEAD
LOCATION SIMPLE: LEFT UPPER BACK
LOCATION SIMPLE: LEFT PRETIBIAL REGION
LOCATION SIMPLE: LEFT CHEEK
LOCATION SIMPLE: RIGHT PRETIBIAL REGION

## 2024-11-26 ASSESSMENT — SEVERITY ASSESSMENT OVERALL AMONG ALL PATIENTS
IN YOUR EXPERIENCE, AMONG ALL PATIENTS YOU HAVE SEEN WITH THIS CONDITION, HOW SEVERE IS THIS PATIENT'S CONDITION?: ALMOST CLEAR

## 2024-11-26 ASSESSMENT — LOCATION ZONE DERM
LOCATION ZONE: FACE
LOCATION ZONE: TRUNK
LOCATION ZONE: ARM
LOCATION ZONE: LEG

## 2024-11-26 NOTE — TELEPHONE ENCOUNTER
Spoke to Samantha prior to call pt's mother. Per Samantha pt's mother can try to flush catheter if that doesn't work she can remove catheter and try cic if still having issue we would get pt in at the [place] office tomorrow. Called spoke with pt's mother. Informed her of Samantha's recommendations. Pt's mother stated they removed catheter and was able to pass in and out catheter got 700 ml's of urine out with mucus. Informed pt's mother to continue with CIC and if any issues call us in the morning. Pt's mother stated she understood.

## 2024-11-26 NOTE — TELEPHONE ENCOUNTER
Pts mother janice, called stating that rosales that was placed last Thursday is clogged and leakage around penis, and pt is having stomach pain,states they are supposed to remove it next week but due to issues they are having now, they would like to know if they can come in today and change it. Please advise if we can see pt for cath.

## 2024-11-26 NOTE — PROCEDURE: COUNSELING
Tazorac Counseling:  Patient advised that medication is irritating and drying.  Patient may need to apply sparingly and wash off after an hour before eventually leaving it on overnight.  The patient verbalized understanding of the proper use and possible adverse effects of tazorac.  All of the patient's questions and concerns were addressed.
Sarecycline Pregnancy And Lactation Text: This medication is Pregnancy Category D and not consider safe during pregnancy. It is also excreted in breast milk.
Spironolactone Counseling: Patient advised regarding risks of diarrhea, abdominal pain, hyperkalemia, birth defects (for female patients), liver toxicity and renal toxicity. The patient may need blood work to monitor liver and kidney function and potassium levels while on therapy. The patient verbalized understanding of the proper use and possible adverse effects of spironolactone.  All of the patient's questions and concerns were addressed.
Isotretinoin Pregnancy And Lactation Text: This medication is Pregnancy Category X and is considered extremely dangerous during pregnancy. It is unknown if it is excreted in breast milk.
Use Enhanced Medication Counseling?: No
Doxycycline Pregnancy And Lactation Text: This medication is Pregnancy Category D and not consider safe during pregnancy. It is also excreted in breast milk but is considered safe for shorter treatment courses.
Minocycline Counseling: Patient advised regarding possible photosensitivity and discoloration of the teeth, skin, lips, tongue and gums.  Patient instructed to avoid sunlight, if possible.  When exposed to sunlight, patients should wear protective clothing, sunglasses, and sunscreen.  The patient was instructed to call the office immediately if the following severe adverse effects occur:  hearing changes, easy bruising/bleeding, severe headache, or vision changes.  The patient verbalized understanding of the proper use and possible adverse effects of minocycline.  All of the patient's questions and concerns were addressed.
Azithromycin Counseling:  I discussed with the patient the risks of azithromycin including but not limited to GI upset, allergic reaction, drug rash, diarrhea, and yeast infections.
Isotretinoin Counseling: Patient should get monthly blood tests, not donate blood, not drive at night if vision affected, not share medication, and not undergo elective surgery for 6 months after tx completed. Side effects reviewed, pt to contact office should one occur.
Detail Level: Zone
Dapsone Counseling: I discussed with the patient the risks of dapsone including but not limited to hemolytic anemia, agranulocytosis, rashes, methemoglobinemia, kidney failure, peripheral neuropathy, headaches, GI upset, and liver toxicity.  Patients who start dapsone require monitoring including baseline LFTs and weekly CBCs for the first month, then every month thereafter.  The patient verbalized understanding of the proper use and possible adverse effects of dapsone.  All of the patient's questions and concerns were addressed.
High Dose Vitamin A Pregnancy And Lactation Text: High dose vitamin A therapy is contraindicated during pregnancy and breast feeding.
Birth Control Pills Counseling: Birth Control Pill Counseling: I discussed with the patient the potential side effects of OCPs including but not limited to increased risk of stroke, heart attack, thrombophlebitis, deep venous thrombosis, hepatic adenomas, breast changes, GI upset, headaches, and depression.  The patient verbalized understanding of the proper use and possible adverse effects of OCPs. All of the patient's questions and concerns were addressed.
Tetracycline Counseling: Patient counseled regarding possible photosensitivity and increased risk for sunburn.  Patient instructed to avoid sunlight, if possible.  When exposed to sunlight, patients should wear protective clothing, sunglasses, and sunscreen.  The patient was instructed to call the office immediately if the following severe adverse effects occur:  hearing changes, easy bruising/bleeding, severe headache, or vision changes.  The patient verbalized understanding of the proper use and possible adverse effects of tetracycline.  All of the patient's questions and concerns were addressed. Patient understands to avoid pregnancy while on therapy due to potential birth defects.
Spironolactone Pregnancy And Lactation Text: This medication can cause feminization of the male fetus and should be avoided during pregnancy. The active metabolite is also found in breast milk.
Benzoyl Peroxide Counseling: Patient counseled that medicine may cause skin irritation and bleach clothing.  In the event of skin irritation, the patient was advised to reduce the amount of the drug applied or use it less frequently.   The patient verbalized understanding of the proper use and possible adverse effects of benzoyl peroxide.  All of the patient's questions and concerns were addressed.
Dapsone Pregnancy And Lactation Text: This medication is Pregnancy Category C and is not considered safe during pregnancy or breast feeding.
Sarecycline Counseling: Patient advised regarding possible photosensitivity and discoloration of the teeth, skin, lips, tongue and gums.  Patient instructed to avoid sunlight, if possible.  When exposed to sunlight, patients should wear protective clothing, sunglasses, and sunscreen.  The patient was instructed to call the office immediately if the following severe adverse effects occur:  hearing changes, easy bruising/bleeding, severe headache, or vision changes.  The patient verbalized understanding of the proper use and possible adverse effects of sarecycline.  All of the patient's questions and concerns were addressed.
Topical Sulfur Applications Counseling: Topical Sulfur Counseling: Patient counseled that this medication may cause skin irritation or allergic reactions.  In the event of skin irritation, the patient was advised to reduce the amount of the drug applied or use it less frequently.   The patient verbalized understanding of the proper use and possible adverse effects of topical sulfur application.  All of the patient's questions and concerns were addressed.
Bactrim Counseling:  I discussed with the patient the risks of sulfa antibiotics including but not limited to GI upset, allergic reaction, drug rash, diarrhea, dizziness, photosensitivity, and yeast infections.  Rarely, more serious reactions can occur including but not limited to aplastic anemia, agranulocytosis, methemoglobinemia, blood dyscrasias, liver or kidney failure, lung infiltrates or desquamative/blistering drug rashes.
Bactrim Pregnancy And Lactation Text: This medication is Pregnancy Category D and is known to cause fetal risk.  It is also excreted in breast milk.
Doxycycline Counseling:  Patient counseled regarding possible photosensitivity and increased risk for sunburn.  Patient instructed to avoid sunlight, if possible.  When exposed to sunlight, patients should wear protective clothing, sunglasses, and sunscreen.  The patient was instructed to call the office immediately if the following severe adverse effects occur:  hearing changes, easy bruising/bleeding, severe headache, or vision changes.  The patient verbalized understanding of the proper use and possible adverse effects of doxycycline.  All of the patient's questions and concerns were addressed.
Erythromycin Pregnancy And Lactation Text: This medication is Pregnancy Category B and is considered safe during pregnancy. It is also excreted in breast milk.
Tazorac Pregnancy And Lactation Text: This medication is not safe during pregnancy. It is unknown if this medication is excreted in breast milk.
Topical Retinoid counseling:  Patient advised to apply a pea-sized amount only at bedtime and wait 30 minutes after washing their face before applying.  If too drying, patient may add a non-comedogenic moisturizer. The patient verbalized understanding of the proper use and possible adverse effects of retinoids.  All of the patient's questions and concerns were addressed.
Azithromycin Pregnancy And Lactation Text: This medication is considered safe during pregnancy and is also secreted in breast milk.
Birth Control Pills Pregnancy And Lactation Text: This medication should be avoided if pregnant and for the first 30 days post-partum.
Benzoyl Peroxide Pregnancy And Lactation Text: This medication is Pregnancy Category C. It is unknown if benzoyl peroxide is excreted in breast milk.
High Dose Vitamin A Counseling: Side effects reviewed, pt to contact office should one occur.
Topical Sulfur Applications Pregnancy And Lactation Text: This medication is Pregnancy Category C and has an unknown safety profile during pregnancy. It is unknown if this topical medication is excreted in breast milk.
Topical Clindamycin Counseling: Patient counseled that this medication may cause skin irritation or allergic reactions.  In the event of skin irritation, the patient was advised to reduce the amount of the drug applied or use it less frequently.   The patient verbalized understanding of the proper use and possible adverse effects of clindamycin.  All of the patient's questions and concerns were addressed.
Topical Clindamycin Pregnancy And Lactation Text: This medication is Pregnancy Category B and is considered safe during pregnancy. It is unknown if it is excreted in breast milk.
Topical Retinoid Pregnancy And Lactation Text: This medication is Pregnancy Category C. It is unknown if this medication is excreted in breast milk.
Erythromycin Counseling:  I discussed with the patient the risks of erythromycin including but not limited to GI upset, allergic reaction, drug rash, diarrhea, increase in liver enzymes, and yeast infections.
Winlevi Counseling:  I discussed with the patient the risks of topical clascoterone including but not limited to erythema, scaling, itching, and stinging. Patient voiced their understanding.
Aklief counseling:  Patient advised to apply a pea-sized amount only at bedtime and wait 30 minutes after washing their face before applying.  If too drying, patient may add a non-comedogenic moisturizer.  The most commonly reported side effects including irritation, redness, scaling, dryness, stinging, burning, itching, and increased risk of sunburn.  The patient verbalized understanding of the proper use and possible adverse effects of retinoids.  All of the patient's questions and concerns were addressed.
Azelaic Acid Pregnancy And Lactation Text: This medication is considered safe during pregnancy and breast feeding.
Winlevi Pregnancy And Lactation Text: This medication is considered safe during pregnancy and breastfeeding.
Aklief Pregnancy And Lactation Text: It is unknown if this medication is safe to use during pregnancy.  It is unknown if this medication is excreted in breast milk.  Breastfeeding women should use the topical cream on the smallest area of the skin for the shortest time needed while breastfeeding.  Do not apply to nipple and areola.
Azelaic Acid Counseling: Patient counseled that medicine may cause skin irritation and to avoid applying near the eyes.  In the event of skin irritation, the patient was advised to reduce the amount of the drug applied or use it less frequently.   The patient verbalized understanding of the proper use and possible adverse effects of azelaic acid.  All of the patient's questions and concerns were addressed.
Detail Level: Simple

## 2025-01-19 ENCOUNTER — HOSPITAL ENCOUNTER (EMERGENCY)
Age: 25
Discharge: HOME | End: 2025-01-19
Attending: EMERGENCY MEDICINE
Payer: COMMERCIAL

## 2025-01-19 ENCOUNTER — APPOINTMENT (OUTPATIENT)
Dept: CT IMAGING | Age: 25
End: 2025-01-19
Payer: COMMERCIAL

## 2025-01-19 VITALS
SYSTOLIC BLOOD PRESSURE: 138 MMHG | TEMPERATURE: 98.3 F | WEIGHT: 200 LBS | RESPIRATION RATE: 16 BRPM | HEART RATE: 92 BPM | DIASTOLIC BLOOD PRESSURE: 88 MMHG | OXYGEN SATURATION: 100 %

## 2025-01-19 DIAGNOSIS — R07.89 CHEST WALL PAIN: Primary | ICD-10-CM

## 2025-01-19 PROBLEM — Z98.2 VP (VENTRICULOPERITONEAL) SHUNT STATUS: Status: ACTIVE | Noted: 2021-11-18

## 2025-01-19 PROBLEM — F90.2 ADHD (ATTENTION DEFICIT HYPERACTIVITY DISORDER), COMBINED TYPE: Status: ACTIVE | Noted: 2017-08-25

## 2025-01-19 PROCEDURE — 99284 EMERGENCY DEPT VISIT MOD MDM: CPT

## 2025-01-19 PROCEDURE — 71250 CT THORAX DX C-: CPT

## 2025-01-19 RX ORDER — HYDROCODONE BITARTRATE AND ACETAMINOPHEN 7.5; 325 MG/1; MG/1
1 TABLET ORAL EVERY 6 HOURS PRN
Qty: 19 TABLET | Refills: 0 | Status: SHIPPED | OUTPATIENT
Start: 2025-01-19 | End: 2025-01-24

## 2025-01-19 RX ORDER — CYCLOBENZAPRINE HCL 5 MG
5 TABLET ORAL 3 TIMES DAILY PRN
COMMUNITY
Start: 2024-12-31

## 2025-01-19 RX ORDER — BACLOFEN 5 MG/1
5 TABLET ORAL 3 TIMES DAILY
COMMUNITY
Start: 2025-01-12

## 2025-01-19 ASSESSMENT — PAIN DESCRIPTION - LOCATION
LOCATION: RIB CAGE
LOCATION: RIB CAGE

## 2025-01-19 ASSESSMENT — LIFESTYLE VARIABLES
HOW OFTEN DO YOU HAVE A DRINK CONTAINING ALCOHOL: NEVER
HOW MANY STANDARD DRINKS CONTAINING ALCOHOL DO YOU HAVE ON A TYPICAL DAY: PATIENT DOES NOT DRINK

## 2025-01-19 ASSESSMENT — PAIN DESCRIPTION - ORIENTATION
ORIENTATION: LEFT
ORIENTATION: LEFT

## 2025-01-19 ASSESSMENT — PAIN DESCRIPTION - DESCRIPTORS
DESCRIPTORS: SPASM
DESCRIPTORS: DISCOMFORT;SHARP

## 2025-01-19 ASSESSMENT — PAIN SCALES - GENERAL
PAINLEVEL_OUTOF10: 3
PAINLEVEL_OUTOF10: 5

## 2025-01-19 ASSESSMENT — PAIN - FUNCTIONAL ASSESSMENT
PAIN_FUNCTIONAL_ASSESSMENT: 0-10
PAIN_FUNCTIONAL_ASSESSMENT: 0-10

## 2025-01-19 NOTE — ED PROVIDER NOTES
Emergency Department Provider Note       PCP: Pily Monroy, APRN - NP   Age: 24 y.o.   Sex: male     DISPOSITION Decision To Discharge 01/19/2025 04:53:33 PM    ICD-10-CM    1. Chest wall pain  R07.89 HYDROcodone-acetaminophen (NORCO) 7.5-325 MG per tablet          Medical Decision Making     24-year-old male patient with history of spina bifida who is wheelchair-bound  Here with progressive left-sided chest pain  Has attempted muscle relaxers without much relief  CT thorax today negative for acute pathology in his area of pain  Will prescribe some pain medication  Advise close follow-up with his primary care doctor and neurosurgeon  Return precautions discussed     1 chronic illness with exacerbation.  Over the counter drug management performed.  Prescription drug management performed.  Patient was discharged risks and benefits of hospitalization were considered.  Shared medical decision making was utilized in creating the patients health plan today.  I independently ordered and reviewed each unique test.    I reviewed external records: provider visit note from PCP.  I reviewed external records: provider visit note from outside specialist.   The patients assessment required an independent historian: Family at bedside.  The reason they were needed is important historical information not provided by the patient.    I interpreted the CT Scan CT thorax revealed no evidence of acute pathology per the radiologist written report.              History     24-year-old male patient with past medical history of spina bifida ADHD and neurogenic bladder presents to the ER with complaints of left sided rib/chest wall pain  Onset over a month  Was seen by their doctor about 20 days ago was trialed on muscle relaxers without significant benefit  Has had persistent pain  States that over the last 2 days his pain has been more intense  Worse with movement and sometimes with position or direct pressure  Also states that

## 2025-01-19 NOTE — DISCHARGE INSTRUCTIONS
Continue all your current medications  Take pain medication as needed  Drink plenty of fluids  Call your doctor in the morning for follow-up    Return to ER for any worsening symptoms or new problems which may arise

## 2025-01-19 NOTE — ED TRIAGE NOTES
Pt arrived via personal wheelchair to triage. Pt c/o L rib pain that gets worse when he moves his arm certain ways and the pain becomes better with massage. Pt has been having the pain for about a month and has seen his PCP with muscle relaxer prescriptions with no relief. Pt has appointment scheduled with his neurosurgeon on 1/28/25. Pt in NAD during triage.

## 2025-01-19 NOTE — ED NOTES
Pt states pain has been present for a month or so. Pt describes pain as \"burning, electric shock sensation. Position and pressure helps alleviate the pain.

## 2025-01-19 NOTE — ED NOTES
Patient mobility status  wheelchair bound.     I have reviewed discharge instructions with the patient and parent.  The patient and parent verbalized understanding.    Patient left ED via Discharge Method: wheelchair to Home with Parent.    Opportunity for questions and clarification provided.     Patient given 1 scripts.

## 2025-01-20 ENCOUNTER — TRANSCRIBE ORDERS (OUTPATIENT)
Dept: SCHEDULING | Age: 25
End: 2025-01-20

## 2025-01-20 DIAGNOSIS — M54.42 ACUTE BACK PAIN WITH SCIATICA, LEFT: Primary | ICD-10-CM

## 2025-01-21 ENCOUNTER — HOSPITAL ENCOUNTER (OUTPATIENT)
Age: 25
Discharge: HOME | End: 2025-01-23
Payer: COMMERCIAL

## 2025-01-21 DIAGNOSIS — M54.42 ACUTE BACK PAIN WITH SCIATICA, LEFT: ICD-10-CM

## 2025-01-21 PROCEDURE — 72148 MRI LUMBAR SPINE W/O DYE: CPT

## 2025-01-21 PROCEDURE — 72146 MRI CHEST SPINE W/O DYE: CPT

## 2025-01-28 ENCOUNTER — OFFICE VISIT (OUTPATIENT)
Dept: NEUROSURGERY | Age: 25
End: 2025-01-28
Payer: COMMERCIAL

## 2025-01-28 VITALS
TEMPERATURE: 98.1 F | OXYGEN SATURATION: 98 % | DIASTOLIC BLOOD PRESSURE: 78 MMHG | HEART RATE: 91 BPM | SYSTOLIC BLOOD PRESSURE: 119 MMHG

## 2025-01-28 DIAGNOSIS — M79.2 NEURALGIA: Primary | ICD-10-CM

## 2025-01-28 DIAGNOSIS — Q05.2 SPINA BIFIDA OF LUMBAR REGION WITH HYDROCEPHALUS (HCC): Chronic | ICD-10-CM

## 2025-01-28 DIAGNOSIS — Z98.2 VP (VENTRICULOPERITONEAL) SHUNT STATUS: ICD-10-CM

## 2025-01-28 PROCEDURE — 99215 OFFICE O/P EST HI 40 MIN: CPT | Performed by: NEUROLOGICAL SURGERY

## 2025-01-28 RX ORDER — PREGABALIN 50 MG/1
50 CAPSULE ORAL 3 TIMES DAILY
Qty: 90 CAPSULE | Refills: 5 | Status: SHIPPED | OUTPATIENT
Start: 2025-01-28 | End: 2025-07-27

## 2025-01-28 NOTE — PROGRESS NOTES
[facility] OFFICE NOTE:         CC: Chest pain    History of Present Illness:  Carlos Rich (2000) is a 24 y.o.  male who was is a longstanding patient of mine with a myelomeningocele hydrocephalus and a  shunt.  He was last seen by me and 2022.  For strictly myelomeningocele standpoint he is overall done relatively well.  The concern is he has had months long progression with fairly recent significant exacerbation of left-sided chest pain.  It is positionally related and notes that as he is leaning over in his wheelchair which is his basic position it hurts worse.  Otherwise he is really had no neurologic changes.  HPI      Past Medical History:   Diagnosis Date    Neurogenic bladder     S/P  shunt     Spina bifida (HCC)     Tethered cord (HCC)       Past Surgical History:   Procedure Laterality Date    BLADDER AUGMENTATION      LUMBAR LAMINECTOMY FOR TETHERED CORD RELEASE      NECK SURGERY      SHUNT REVISION      x3    TENDON RELEASE      bilateral ankles    VENTRICULOPERITONEAL SHUNT        Current Outpatient Medications   Medication Sig Dispense Refill    pregabalin (LYRICA) 50 MG capsule Take 1 capsule by mouth 3 times daily for 180 days. Max Daily Amount: 150 mg 90 capsule 5    Baclofen (LIORESAL) 5 MG tablet Take 1 tablet by mouth 3 times daily      cyclobenzaprine (FLEXERIL) 5 MG tablet Take 1 tablet by mouth 3 times daily as needed      sod chloride IRR soln 0.9 % irrigation Irrigation route daily for 30 doses. Via rosales catheter 6000 mL 11    acetaminophen (TYLENOL) 325 MG tablet Take 1 tablet by mouth as needed      cetirizine (ZYRTEC) 10 MG tablet Take by mouth      vitamin D (CHOLECALCIFEROL) 25 MCG (1000 UT) TABS tablet Take 1 tablet by mouth daily      lactobacillus (CULTURELLE) CAPS capsule Take 1 capsule by mouth daily      RABEprazole (ACIPHEX) 20 MG tablet TAKE ONE TABLET BY MOUTH ONE TIME DAILY       No current facility-administered medications for

## 2025-02-04 ENCOUNTER — TELEPHONE (OUTPATIENT)
Dept: NEUROSURGERY | Age: 25
End: 2025-02-04

## 2025-02-05 NOTE — TELEPHONE ENCOUNTER
Jesi mom let her know we are just waiting on Dr. Resendiz to finish his note, patients mom in understanding. Will send to Dr. Resendiz to make him aware.

## 2025-02-09 PROBLEM — M79.2 NEURALGIA: Status: ACTIVE | Noted: 2025-02-09

## 2025-02-10 DIAGNOSIS — M79.2 NEURALGIA: Primary | ICD-10-CM

## 2025-02-10 DIAGNOSIS — Q05.2 SPINA BIFIDA OF LUMBAR REGION WITH HYDROCEPHALUS (HCC): ICD-10-CM

## 2025-02-10 DIAGNOSIS — Z98.2 VP (VENTRICULOPERITONEAL) SHUNT STATUS: ICD-10-CM

## 2025-02-14 ENCOUNTER — HOSPITAL ENCOUNTER (OUTPATIENT)
Dept: INTERVENTIONAL RADIOLOGY/VASCULAR | Age: 25
Discharge: HOME | End: 2025-02-17
Attending: NEUROLOGICAL SURGERY
Payer: COMMERCIAL

## 2025-02-14 VITALS
DIASTOLIC BLOOD PRESSURE: 73 MMHG | RESPIRATION RATE: 18 BRPM | TEMPERATURE: 98.4 F | SYSTOLIC BLOOD PRESSURE: 121 MMHG | HEART RATE: 93 BPM | OXYGEN SATURATION: 99 %

## 2025-02-14 DIAGNOSIS — Z98.2 VP (VENTRICULOPERITONEAL) SHUNT STATUS: ICD-10-CM

## 2025-02-14 DIAGNOSIS — M79.2 NEURALGIA: ICD-10-CM

## 2025-02-14 DIAGNOSIS — Q05.2 SPINA BIFIDA OF LUMBAR REGION WITH HYDROCEPHALUS (HCC): ICD-10-CM

## 2025-02-14 PROCEDURE — 6360000002 HC RX W HCPCS: Performed by: RADIOLOGY

## 2025-02-14 PROCEDURE — 6370000000 HC RX 637 (ALT 250 FOR IP): Performed by: RADIOLOGY

## 2025-02-14 PROCEDURE — 77002 NEEDLE LOCALIZATION BY XRAY: CPT | Performed by: RADIOLOGY

## 2025-02-14 PROCEDURE — 64420 NJX AA&/STRD NTRCOST NRV 1: CPT | Performed by: RADIOLOGY

## 2025-02-14 PROCEDURE — 64421 NJX AA&/STRD NTRCOST NRV EA: CPT

## 2025-02-14 PROCEDURE — 64421 NJX AA&/STRD NTRCOST NRV EA: CPT | Performed by: RADIOLOGY

## 2025-02-14 PROCEDURE — 64420 NJX AA&/STRD NTRCOST NRV 1: CPT

## 2025-02-14 RX ORDER — LIDOCAINE HYDROCHLORIDE 20 MG/ML
INJECTION, SOLUTION INFILTRATION; PERINEURAL PRN
Status: COMPLETED | OUTPATIENT
Start: 2025-02-14 | End: 2025-02-14

## 2025-02-14 RX ORDER — METHYLPREDNISOLONE ACETATE 80 MG/ML
INJECTION, SUSPENSION INTRA-ARTICULAR; INTRALESIONAL; INTRAMUSCULAR; SOFT TISSUE PRN
Status: COMPLETED | OUTPATIENT
Start: 2025-02-14 | End: 2025-02-14

## 2025-02-14 RX ORDER — DIAZEPAM 2 MG/1
TABLET ORAL PRN
Status: COMPLETED | OUTPATIENT
Start: 2025-02-14 | End: 2025-02-14

## 2025-02-14 RX ORDER — BUPIVACAINE HYDROCHLORIDE 5 MG/ML
INJECTION, SOLUTION EPIDURAL; INTRACAUDAL PRN
Status: COMPLETED | OUTPATIENT
Start: 2025-02-14 | End: 2025-02-14

## 2025-02-14 RX ADMIN — DIAZEPAM 10 MG: 2 TABLET ORAL at 13:32

## 2025-02-14 RX ADMIN — METHYLPREDNISOLONE ACETATE 80 MG: 80 INJECTION, SUSPENSION INTRA-ARTICULAR; INTRALESIONAL; INTRAMUSCULAR; SOFT TISSUE at 14:10

## 2025-02-14 RX ADMIN — LIDOCAINE HYDROCHLORIDE 5 ML: 20 INJECTION, SOLUTION INFILTRATION; PERINEURAL at 14:09

## 2025-02-14 RX ADMIN — LIDOCAINE HYDROCHLORIDE 15 ML: 20 INJECTION, SOLUTION INFILTRATION; PERINEURAL at 14:00

## 2025-02-14 RX ADMIN — BUPIVACAINE HYDROCHLORIDE 100 MG: 5 INJECTION, SOLUTION EPIDURAL; INTRACAUDAL; PERINEURAL at 14:10

## 2025-02-14 ASSESSMENT — PAIN SCALES - GENERAL: PAINLEVEL_OUTOF10: 3

## 2025-02-14 ASSESSMENT — PAIN DESCRIPTION - LOCATION: LOCATION: BACK

## 2025-02-14 NOTE — DISCHARGE INSTRUCTIONS
If you have any questions about your procedure, please call the Interventional Radiology department at [phone].      After business hours (5pm) and weekends, call the answering service at [phone] and ask for the Interventional Radiologist on call to be paged.            Si tiene Preguntas acerca del procedimiento, por favor llame al departamento de Radiología Intervencional al [phone].      Después de horas de oficina (5 pm) y los fines de semana, llamar al servicio de llamadas al [phone] y pregunte por el Radiologo de jazmín.

## 2025-02-14 NOTE — PROGRESS NOTES
TRANSFER - OUT REPORT:    Verbal report given to KEVIN Aguero on Carlos Rich  being transferred to IR prep room 5 for routine post-op       Report consisted of patient's Situation, Background, Assessment and   Recommendations(SBAR).     Information from the following report(s) Surgery Report and MAR was reviewed with the receiving nurse.           Lines:       Opportunity for questions and clarification was provided.

## 2025-02-14 NOTE — BRIEF OP NOTE
[facility]  Department of Interventional Radiology  [phone]        Brief Procedure Note    Patient: Carlos Rich MRN: [id number]  SSN: [id number]    YOB: 2000  Age: 24 y.o.  Sex: male      Date of Procedure: 2/14/2025     Pre-Procedure Diagnosis:   Left rib pain localized to  #5, 6.      Post-Procedure Diagnosis:  SAME    Procedure(s):  Intercostal Nerve BLock    Brief Description of Procedure:  as above    Performed By:  TALI MANZO MD     Assistants:  None    Anesthesia:  Lidocaine    Estimated Blood Loss:  None    Specimens:  None    Implants:  None    Findings:  Good needle placement at all 3 levels.      Complications:  None    Recommendations:  Repeat as necessary, consider Cryoablation for a longer term effect.       Follow Up:  Telephone call next week.      Signed By: TALI MANZO MD     February 14, 2025

## 2025-02-14 NOTE — OR NURSING
Recovery period without difficulty. Pt alert and oriented and denies pain. Dressing to left upper back is clean, dry, and intact. Reviewed discharge instructions with patient and mother, both verbalized understanding. Pt escorted to lobby discharge area via wheelchair. Vital signs and Grace score completed.

## 2025-02-19 ENCOUNTER — TELEPHONE (OUTPATIENT)
Age: 25
End: 2025-02-19

## 2025-02-19 NOTE — TELEPHONE ENCOUNTER
I called patient today to check up and see how he is doing after his intercostal nerve block 5 days ago.  I spoke to his mother who states the pain is slightly improved.  It was a 3 out of 10 and is now a 2 out of 10.  I will reach back out to them early next week when Dr. Erickson is back in the office to discuss neck step recommendations.

## 2025-02-25 ENCOUNTER — TELEPHONE (OUTPATIENT)
Dept: INTERVENTIONAL RADIOLOGY/VASCULAR | Age: 25
End: 2025-02-25

## 2025-02-25 DIAGNOSIS — M54.42 ACUTE BACK PAIN WITH SCIATICA, LEFT: ICD-10-CM

## 2025-02-25 DIAGNOSIS — Z01.818 PRE-OP TESTING: Primary | ICD-10-CM

## 2025-02-25 DIAGNOSIS — Q05.2 SPINA BIFIDA OF LUMBAR REGION WITH HYDROCEPHALUS (HCC): Primary | ICD-10-CM

## 2025-02-25 NOTE — TELEPHONE ENCOUNTER
I spoke to the patient's mother and she would like to proceed with getting her son scheduled for left intercostal nerve ablation with Dr. Erickson under anesthesia.  Patient has a history of spina bifida who is wheelchair-bound and severe scoliosis.  Has progressive worsening left-sided chest/rib pain since this past December.  Order will be placed for outpatient labs to be obtained prior to procedure date

## 2025-02-26 DIAGNOSIS — Z01.818 PRE-OP TESTING: ICD-10-CM

## 2025-02-26 LAB
ANION GAP SERPL CALC-SCNC: 13 MMOL/L (ref 7–16)
BASOPHILS # BLD: 0.04 K/UL (ref 0–0.2)
BASOPHILS NFR BLD: 0.8 % (ref 0–2)
BUN SERPL-MCNC: 17 MG/DL (ref 6–23)
CALCIUM SERPL-MCNC: 9.8 MG/DL (ref 8.8–10.2)
CHLORIDE SERPL-SCNC: 105 MMOL/L (ref 98–107)
CO2 SERPL-SCNC: 27 MMOL/L (ref 20–29)
CREAT SERPL-MCNC: 0.5 MG/DL (ref 0.8–1.3)
DIFFERENTIAL METHOD BLD: ABNORMAL
EOSINOPHIL # BLD: 0.22 K/UL (ref 0–0.8)
EOSINOPHIL NFR BLD: 4.2 % (ref 0.5–7.8)
ERYTHROCYTE [DISTWIDTH] IN BLOOD BY AUTOMATED COUNT: 14.1 % (ref 11.9–14.6)
GLUCOSE SERPL-MCNC: 91 MG/DL (ref 70–99)
HCT VFR BLD AUTO: 43.4 % (ref 41.1–50.3)
HGB BLD-MCNC: 13.6 G/DL (ref 13.6–17.2)
IMM GRANULOCYTES # BLD AUTO: 0.02 K/UL (ref 0–0.5)
IMM GRANULOCYTES NFR BLD AUTO: 0.4 % (ref 0–5)
LYMPHOCYTES # BLD: 1.98 K/UL (ref 0.5–4.6)
LYMPHOCYTES NFR BLD: 37.4 % (ref 13–44)
MCH RBC QN AUTO: 23.8 PG (ref 26.1–32.9)
MCHC RBC AUTO-ENTMCNC: 31.3 G/DL (ref 31.4–35)
MCV RBC AUTO: 76 FL (ref 82–102)
MONOCYTES # BLD: 0.46 K/UL (ref 0.1–1.3)
MONOCYTES NFR BLD: 8.7 % (ref 4–12)
NEUTS SEG # BLD: 2.57 K/UL (ref 1.7–8.2)
NEUTS SEG NFR BLD: 48.5 % (ref 43–78)
NRBC # BLD: 0 K/UL (ref 0–0.2)
PLATELET # BLD AUTO: 222 K/UL (ref 150–450)
PMV BLD AUTO: 10.3 FL (ref 9.4–12.3)
POTASSIUM SERPL-SCNC: 3.8 MMOL/L (ref 3.5–5.1)
RBC # BLD AUTO: 5.71 M/UL (ref 4.23–5.6)
SODIUM SERPL-SCNC: 145 MMOL/L (ref 136–145)
WBC # BLD AUTO: 5.3 K/UL (ref 4.3–11.1)

## 2025-03-04 ENCOUNTER — OFFICE VISIT (OUTPATIENT)
Dept: UROLOGY | Age: 25
End: 2025-03-04
Payer: COMMERCIAL

## 2025-03-04 ENCOUNTER — TELEPHONE (OUTPATIENT)
Dept: UROLOGY | Age: 25
End: 2025-03-04

## 2025-03-04 DIAGNOSIS — R33.9 URINARY RETENTION: Primary | ICD-10-CM

## 2025-03-04 DIAGNOSIS — N31.9 NEUROGENIC BLADDER: ICD-10-CM

## 2025-03-04 PROCEDURE — 99214 OFFICE O/P EST MOD 30 MIN: CPT | Performed by: NURSE PRACTITIONER

## 2025-03-04 RX ORDER — MAGNESIUM HYDROXIDE 1200 MG/15ML
LIQUID ORAL
Qty: 6000 ML | Refills: 11 | Status: SHIPPED | OUTPATIENT
Start: 2025-03-04

## 2025-03-04 ASSESSMENT — ENCOUNTER SYMPTOMS
BACK PAIN: 0
NAUSEA: 0

## 2025-03-04 NOTE — TELEPHONE ENCOUNTER
Please call [facility] to update cath order. Using Cure straight tip 16\" 14 fr uncoated 6x/day.     Also contact [facility] [phone] to update DME order. He receives 5 products from them.    Carol Alex, APRN - CNP

## 2025-03-04 NOTE — PROGRESS NOTES
POC URINALYSIS DIP STICK AUTO W/O MICRO    Collection Time: 10/04/22  3:08 PM   Result Value Ref Range    Color (UA POC)      Clarity (UA POC)      Glucose, Urine, POC Negative Negative    Bilirubin, Urine, POC Negative Negative    KETONES, Urine, POC Negative Negative    Specific Gravity, Urine, POC 1.010 1.001 - 1.035    Blood (UA POC) Large Negative    pH, Urine, POC 7.0 4.6 - 8.0    Protein, Urine, POC 30 Negative    Urobilinogen, POC 2 mg/dL     Nitrite, Urine, POC Negative Negative    Leukocyte Esterase, Urine, POC Small Negative       UA - Micro  WBC - 0  RBC - 0  Bacteria - 0  Epith - 0    PHYSICAL EXAM    General appearance - well appearing and in no distress  Mental status - alert, oriented to person, place, and time  Neck - supple, no significant adenopathy  Chest/Lung-  Quiet, even and easy respiratory effort without use of accessory muscles  Skin - normal coloration and turgor, no rashes      Assessment and Plan    ICD-10-CM    1. Urinary retention  R33.9       2. Neurogenic bladder  N31.9 sod chloride IRR soln 0.9 % irrigation          Urinary retentions/NGB- [facility] 6x/day. Sodium chloride irrigation PRN sediment. Given 16 fr straight tip cath to use PRN issues w CIC. Will update DME and cath orders.     RTO in 1 year for follow up. Advised to call sooner if symptoms worsen.    Carol Alex, APRN - CNP  Dr. Gaxiola is supervising physician today and he approves plan of care.      I have spent 31 minutes today reviewing previous notes, test results and face to face with the patient as well as documenting.

## 2025-03-13 ENCOUNTER — TELEPHONE (OUTPATIENT)
Dept: INTERVENTIONAL RADIOLOGY/VASCULAR | Age: 25
End: 2025-03-13

## 2025-03-13 NOTE — TELEPHONE ENCOUNTER
[] Phone call to: Dayo soto     [] Number used to reach this person: [phone]    [] Voicemail reached: N/A     [] Appointment date:  3/20/25    [] Arrival time:  1000    [] Location given: yes    [] AM Medicine with a small sip of water: Yes    [] Patient is NPO: Yes    [] Need for : Yes    [] Anesthetic Anesthesia    [] Blood thinners held: No    [] Education on Hold requirements prior to procedure: NA     [] Allergies: OTHER: Latex    [] Reviewed    [] Latex Allergy: Yes    [] Lidocaine Allergy: No    [] CPAP at night: No    [] Any recent infections: No    [] Diabetes: No    [] Additional information:  NA      [] Time taken to answer all questions    [] Call back phone number of [phone] given

## 2025-03-19 ENCOUNTER — ANESTHESIA EVENT (OUTPATIENT)
Dept: INTERVENTIONAL RADIOLOGY/VASCULAR | Age: 25
End: 2025-03-19
Payer: COMMERCIAL

## 2025-03-20 ENCOUNTER — HOSPITAL ENCOUNTER (OUTPATIENT)
Dept: CT IMAGING | Age: 25
Discharge: HOME | End: 2025-03-22
Attending: RADIOLOGY
Payer: COMMERCIAL

## 2025-03-20 ENCOUNTER — HOSPITAL ENCOUNTER (OUTPATIENT)
Dept: INTERVENTIONAL RADIOLOGY/VASCULAR | Age: 25
Discharge: HOME | End: 2025-03-22
Attending: RADIOLOGY
Payer: COMMERCIAL

## 2025-03-20 ENCOUNTER — ANESTHESIA (OUTPATIENT)
Dept: INTERVENTIONAL RADIOLOGY/VASCULAR | Age: 25
End: 2025-03-20
Payer: COMMERCIAL

## 2025-03-20 VITALS
OXYGEN SATURATION: 78 % | DIASTOLIC BLOOD PRESSURE: 83 MMHG | HEART RATE: 96 BPM | SYSTOLIC BLOOD PRESSURE: 134 MMHG | RESPIRATION RATE: 18 BRPM

## 2025-03-20 VITALS
SYSTOLIC BLOOD PRESSURE: 109 MMHG | WEIGHT: 200 LBS | BODY MASS INDEX: 39.27 KG/M2 | TEMPERATURE: 97.8 F | OXYGEN SATURATION: 99 % | RESPIRATION RATE: 14 BRPM | DIASTOLIC BLOOD PRESSURE: 65 MMHG | HEART RATE: 92 BPM | HEIGHT: 60 IN

## 2025-03-20 DIAGNOSIS — M54.42 ACUTE BACK PAIN WITH SCIATICA, LEFT: ICD-10-CM

## 2025-03-20 DIAGNOSIS — Q05.2 SPINA BIFIDA OF LUMBAR REGION WITH HYDROCEPHALUS (HCC): ICD-10-CM

## 2025-03-20 PROCEDURE — 6360000002 HC RX W HCPCS: Performed by: RADIOLOGY

## 2025-03-20 PROCEDURE — 2580000003 HC RX 258: Performed by: REGISTERED NURSE

## 2025-03-20 PROCEDURE — 6360000002 HC RX W HCPCS: Performed by: REGISTERED NURSE

## 2025-03-20 PROCEDURE — 2500000003 HC RX 250 WO HCPCS: Performed by: REGISTERED NURSE

## 2025-03-20 PROCEDURE — 77013 CT GUIDE FOR TISSUE ABLATION: CPT

## 2025-03-20 PROCEDURE — 77012 CT SCAN FOR NEEDLE BIOPSY: CPT

## 2025-03-20 RX ORDER — OXYCODONE HYDROCHLORIDE 5 MG/1
5 TABLET ORAL
Refills: 0 | OUTPATIENT
Start: 2025-03-20

## 2025-03-20 RX ORDER — DEXAMETHASONE SODIUM PHOSPHATE 10 MG/ML
INJECTION, SOLUTION INTRA-ARTICULAR; INTRALESIONAL; INTRAMUSCULAR; INTRAVENOUS; SOFT TISSUE
Status: DISCONTINUED | OUTPATIENT
Start: 2025-03-20 | End: 2025-03-20 | Stop reason: SDUPTHER

## 2025-03-20 RX ORDER — ONDANSETRON 2 MG/ML
4 INJECTION INTRAMUSCULAR; INTRAVENOUS
OUTPATIENT
Start: 2025-03-20

## 2025-03-20 RX ORDER — SODIUM CHLORIDE 9 MG/ML
INJECTION, SOLUTION INTRAVENOUS PRN
OUTPATIENT
Start: 2025-03-20

## 2025-03-20 RX ORDER — OXYCODONE HYDROCHLORIDE 5 MG/1
10 TABLET ORAL EVERY 4 HOURS PRN
Refills: 0 | Status: DISCONTINUED | OUTPATIENT
Start: 2025-03-20 | End: 2025-03-23 | Stop reason: HOSPADM

## 2025-03-20 RX ORDER — SODIUM CHLORIDE, SODIUM LACTATE, POTASSIUM CHLORIDE, CALCIUM CHLORIDE 600; 310; 30; 20 MG/100ML; MG/100ML; MG/100ML; MG/100ML
INJECTION, SOLUTION INTRAVENOUS CONTINUOUS
OUTPATIENT
Start: 2025-03-20

## 2025-03-20 RX ORDER — KETAMINE HCL IN NACL, ISO-OSM 20 MG/2 ML
SYRINGE (ML) INJECTION
Status: DISCONTINUED | OUTPATIENT
Start: 2025-03-20 | End: 2025-03-20 | Stop reason: SDUPTHER

## 2025-03-20 RX ORDER — NALOXONE HYDROCHLORIDE 0.4 MG/ML
INJECTION, SOLUTION INTRAMUSCULAR; INTRAVENOUS; SUBCUTANEOUS PRN
OUTPATIENT
Start: 2025-03-20

## 2025-03-20 RX ORDER — LIDOCAINE HYDROCHLORIDE 10 MG/ML
1 INJECTION, SOLUTION INFILTRATION; PERINEURAL
OUTPATIENT
Start: 2025-03-20

## 2025-03-20 RX ORDER — SODIUM CHLORIDE 0.9 % (FLUSH) 0.9 %
5-40 SYRINGE (ML) INJECTION PRN
OUTPATIENT
Start: 2025-03-20

## 2025-03-20 RX ORDER — SODIUM CHLORIDE, SODIUM LACTATE, POTASSIUM CHLORIDE, CALCIUM CHLORIDE 600; 310; 30; 20 MG/100ML; MG/100ML; MG/100ML; MG/100ML
INJECTION, SOLUTION INTRAVENOUS
Status: DISCONTINUED | OUTPATIENT
Start: 2025-03-20 | End: 2025-03-20 | Stop reason: SDUPTHER

## 2025-03-20 RX ORDER — NEOSTIGMINE METHYLSULFATE 1 MG/ML
INJECTION, SOLUTION INTRAVENOUS
Status: DISCONTINUED | OUTPATIENT
Start: 2025-03-20 | End: 2025-03-20

## 2025-03-20 RX ORDER — SODIUM CHLORIDE 0.9 % (FLUSH) 0.9 %
5-40 SYRINGE (ML) INJECTION EVERY 12 HOURS SCHEDULED
OUTPATIENT
Start: 2025-03-20

## 2025-03-20 RX ORDER — PROPOFOL 10 MG/ML
INJECTION, EMULSION INTRAVENOUS
Status: DISCONTINUED | OUTPATIENT
Start: 2025-03-20 | End: 2025-03-20 | Stop reason: SDUPTHER

## 2025-03-20 RX ORDER — ONDANSETRON 2 MG/ML
INJECTION INTRAMUSCULAR; INTRAVENOUS
Status: DISCONTINUED | OUTPATIENT
Start: 2025-03-20 | End: 2025-03-20 | Stop reason: SDUPTHER

## 2025-03-20 RX ORDER — ROCURONIUM BROMIDE 10 MG/ML
INJECTION, SOLUTION INTRAVENOUS
Status: DISCONTINUED | OUTPATIENT
Start: 2025-03-20 | End: 2025-03-20 | Stop reason: SDUPTHER

## 2025-03-20 RX ORDER — MIDAZOLAM HYDROCHLORIDE 2 MG/2ML
2 INJECTION, SOLUTION INTRAMUSCULAR; INTRAVENOUS
OUTPATIENT
Start: 2025-03-20

## 2025-03-20 RX ORDER — LIDOCAINE HYDROCHLORIDE 20 MG/ML
INJECTION, SOLUTION EPIDURAL; INFILTRATION; INTRACAUDAL; PERINEURAL
Status: DISCONTINUED | OUTPATIENT
Start: 2025-03-20 | End: 2025-03-20 | Stop reason: SDUPTHER

## 2025-03-20 RX ORDER — SODIUM CHLORIDE 9 MG/ML
INJECTION, SOLUTION INTRAVENOUS CONTINUOUS
Status: DISCONTINUED | OUTPATIENT
Start: 2025-03-20 | End: 2025-03-23 | Stop reason: HOSPADM

## 2025-03-20 RX ORDER — IBUPROFEN 600 MG/1
1 TABLET ORAL PRN
OUTPATIENT
Start: 2025-03-20

## 2025-03-20 RX ORDER — MEPERIDINE HYDROCHLORIDE 25 MG/ML
12.5 INJECTION INTRAMUSCULAR; INTRAVENOUS; SUBCUTANEOUS EVERY 5 MIN PRN
Refills: 0 | OUTPATIENT
Start: 2025-03-20

## 2025-03-20 RX ORDER — FENTANYL CITRATE 50 UG/ML
25 INJECTION, SOLUTION INTRAMUSCULAR; INTRAVENOUS EVERY 5 MIN PRN
Refills: 0 | OUTPATIENT
Start: 2025-03-20

## 2025-03-20 RX ORDER — OXYCODONE HYDROCHLORIDE 5 MG/1
5 TABLET ORAL EVERY 4 HOURS PRN
Refills: 0 | Status: DISCONTINUED | OUTPATIENT
Start: 2025-03-20 | End: 2025-03-23 | Stop reason: HOSPADM

## 2025-03-20 RX ORDER — PROCHLORPERAZINE EDISYLATE 5 MG/ML
5 INJECTION INTRAMUSCULAR; INTRAVENOUS
OUTPATIENT
Start: 2025-03-20

## 2025-03-20 RX ORDER — DEXTROSE MONOHYDRATE 100 MG/ML
INJECTION, SOLUTION INTRAVENOUS CONTINUOUS PRN
OUTPATIENT
Start: 2025-03-20

## 2025-03-20 RX ORDER — DIPHENHYDRAMINE HYDROCHLORIDE 50 MG/ML
12.5 INJECTION, SOLUTION INTRAMUSCULAR; INTRAVENOUS
OUTPATIENT
Start: 2025-03-20

## 2025-03-20 RX ORDER — LIDOCAINE HYDROCHLORIDE 10 MG/ML
INJECTION, SOLUTION EPIDURAL; INFILTRATION; INTRACAUDAL; PERINEURAL PRN
Status: COMPLETED | OUTPATIENT
Start: 2025-03-20 | End: 2025-03-20

## 2025-03-20 RX ADMIN — ROCURONIUM BROMIDE 10 MG: 10 INJECTION, SOLUTION INTRAVENOUS at 13:06

## 2025-03-20 RX ADMIN — SODIUM CHLORIDE, SODIUM LACTATE, POTASSIUM CHLORIDE, AND CALCIUM CHLORIDE: 600; 310; 30; 20 INJECTION, SOLUTION INTRAVENOUS at 12:03

## 2025-03-20 RX ADMIN — PHENYLEPHRINE HYDROCHLORIDE 100 MCG: 10 INJECTION INTRAVENOUS at 12:12

## 2025-03-20 RX ADMIN — LIDOCAINE HYDROCHLORIDE 20 ML: 10 INJECTION, SOLUTION EPIDURAL; INFILTRATION; INTRACAUDAL; PERINEURAL at 12:42

## 2025-03-20 RX ADMIN — PROPOFOL 160 MG: 10 INJECTION, EMULSION INTRAVENOUS at 12:04

## 2025-03-20 RX ADMIN — Medication 20 MG: at 12:04

## 2025-03-20 RX ADMIN — SUGAMMADEX 200 MG: 100 INJECTION, SOLUTION INTRAVENOUS at 13:52

## 2025-03-20 RX ADMIN — DEXAMETHASONE SODIUM PHOSPHATE 10 MG: 10 INJECTION INTRAMUSCULAR; INTRAVENOUS at 12:24

## 2025-03-20 RX ADMIN — PHENYLEPHRINE HYDROCHLORIDE 200 MCG: 10 INJECTION INTRAVENOUS at 12:27

## 2025-03-20 RX ADMIN — ROCURONIUM BROMIDE 10 MG: 10 INJECTION, SOLUTION INTRAVENOUS at 13:32

## 2025-03-20 RX ADMIN — LIDOCAINE HYDROCHLORIDE 80 MG: 20 INJECTION, SOLUTION EPIDURAL; INFILTRATION; INTRACAUDAL; PERINEURAL at 12:04

## 2025-03-20 RX ADMIN — FENTANYL CITRATE 50 MCG: 50 INJECTION INTRAMUSCULAR; INTRAVENOUS at 12:04

## 2025-03-20 RX ADMIN — ROCURONIUM BROMIDE 50 MG: 10 INJECTION, SOLUTION INTRAVENOUS at 12:05

## 2025-03-20 RX ADMIN — FENTANYL CITRATE 25 MCG: 50 INJECTION INTRAMUSCULAR; INTRAVENOUS at 13:21

## 2025-03-20 RX ADMIN — ONDANSETRON 4 MG: 2 INJECTION INTRAMUSCULAR; INTRAVENOUS at 12:24

## 2025-03-20 NOTE — BRIEF OP NOTE
[facility]  Department of Interventional Radiology  [phone]        Brief Procedure Note    Patient: Carlos Rich MRN: [id number]  SSN: [id number]    YOB: 2000  Age: 24 y.o.  Sex: male      Date of Procedure: 3/20/2025     Pre-Procedure Diagnosis:   Left rib pain    Post-Procedure Diagnosis:  SAME    Procedure(s):  Image Guided intercostal nerve ablation    Brief Description of Procedure:  as above    Performed By:  TALI MANZO MD     Assistants:  None    Anesthesia:  General    Estimated Blood Loss:  Less than 10ml    Specimens:  None    Implants:  None    Findings:  No Pneumothorax or hematoma.     Complications:  None    Recommendations:  1 hour bedrest.      Follow Up:  Office or Virtual in 1-2 weeks.      Signed By: TALI MANZO MD     March 20, 2025

## 2025-03-20 NOTE — H&P
RABEprazole (ACIPHEX) 20 MG tablet, TAKE ONE TABLET BY MOUTH ONE TIME DAILY, Disp: , Rfl:      Allergies   Allergen Reactions    Latex Other (See Comments)       Family History   Problem Relation Age of Onset    Dementia Maternal Grandmother     Stroke Maternal Grandmother      Social History     Tobacco Use    Smoking status: Never    Smokeless tobacco: Never   Substance Use Topics    Alcohol use: Never          Objective:       Physical Examination:    There were no vitals filed for this visit.    General:  overweight, atrophic lower extremities  Heart:  regular rate and rhythm and S1, S2 normal  Lungs:  clear to auscultation bilaterally  Abdomen:  protuberant, nondistended, and normal bowel sounds  Neuro:  normal without focal findings    Laboratory:     Lab Results   Component Value Date/Time     02/26/2025 12:59 PM    K 3.8 02/26/2025 12:59 PM     02/26/2025 12:59 PM    CO2 27 02/26/2025 12:59 PM    BUN 17 02/26/2025 12:59 PM     Lab Results   Component Value Date/Time    WBC 5.3 02/26/2025 12:59 PM    HGB 13.6 02/26/2025 12:59 PM    HCT 43.4 02/26/2025 12:59 PM     02/26/2025 12:59 PM     No results found for: \"APTT\", \"INR\"    Assessment:     Left rib/chest wall pain    Plan:     Planned Procedure:  CT guided cryoablation neurolysis.  GETA.      Risks, benefits, and alternatives reviewed with patient and he agrees to proceed.      More than 28 minutes spent in Medical Record Review, Imaging Study Review, Laboratory Result Review, Discussion with Patient, Discussion w Family (when relevant), Obtaining History, and Performing Physical Examination.      Signed By: TALI MANZO MD     March 20, 2025

## 2025-03-20 NOTE — DISCHARGE INSTRUCTIONS
If you have any questions about your procedure, please call the Interventional Radiology department at [phone].     After business hours (5pm) and weekends, call the answering service at [phone] and ask for the Interventional Radiologist on call to be paged.

## 2025-03-20 NOTE — ANESTHESIA PRE PROCEDURE
capsule Take 1 capsule by mouth daily      RABEprazole (ACIPHEX) 20 MG tablet TAKE ONE TABLET BY MOUTH ONE TIME DAILY      sod chloride IRR soln 0.9 % irrigation Irrigation route daily for 30 doses. Via rosales catheter 6000 mL 11    Baclofen (LIORESAL) 5 MG tablet Take 1 tablet by mouth 3 times daily      cyclobenzaprine (FLEXERIL) 5 MG tablet Take 1 tablet by mouth 3 times daily as needed       No current facility-administered medications for this encounter.       Allergies:    Allergies   Allergen Reactions    Latex Other (See Comments)       Problem List:    Patient Active Problem List   Diagnosis Code    ADHD (attention deficit hyperactivity disorder), combined type F90.2    Neurogenic bladder N31.9    Spina bifida (HCC) Q05.9     (ventriculoperitoneal) shunt status Z98.2    Neuralgia M79.2       Past Medical History:        Diagnosis Date    Neurogenic bladder     S/P  shunt     Spina bifida (HCC)     Tethered cord (HCC)        Past Surgical History:        Procedure Laterality Date    BLADDER AUGMENTATION      LUMBAR LAMINECTOMY FOR TETHERED CORD RELEASE      NECK SURGERY      SHUNT REVISION      x3    TENDON RELEASE      bilateral ankles    VENTRICULOPERITONEAL SHUNT         Social History:    Social History     Tobacco Use    Smoking status: Never    Smokeless tobacco: Never   Substance Use Topics    Alcohol use: Never                                Counseling given: Not Answered      Vital Signs (Current):   Vitals:    03/20/25 1045   BP: (!) 151/100   Pulse: 94   Resp: 18   Temp: 98.7 °F (37.1 °C)   TempSrc: Oral   SpO2: 94%   Weight: 90.7 kg (200 lb)   Height: 1.524 m (5')                                              BP Readings from Last 3 Encounters:   03/20/25 (!) 151/100   02/14/25 121/73   01/28/25 119/78       NPO Status:                                                   Date of last liquid consumption: 03/19/25                        Date of last solid food consumption: 03/19/25    BMI:   Wt

## 2025-03-20 NOTE — OR NURSING
Recovery period without difficulty. Pt alert and oriented and denies pain. Dressing is clean, dry, and intact. Reviewed discharge instructions with patient and mother, both verbalized understanding. [facility] Vital signs and Grace score completed.

## 2025-03-20 NOTE — ANESTHESIA POSTPROCEDURE EVALUATION
Department of Anesthesiology  Postprocedure Note    Patient: Carlos Rich  MRN: [id number]  YOB: 2000  Date of evaluation: 3/20/2025    Procedure Summary       Date: 03/20/25 Room / Location: [facility]; [place]    Anesthesia Start: 1153 Anesthesia Stop: 1411    Procedures:       CT GUIDED VISCERAL TISSUE ABLATION      IR CRYOABLATION BONE TUMOR(S) PERC Diagnosis:       Acute back pain with sciatica, left      Spina bifida of lumbar region with hydrocephalus (HCC)      (Spina bifida of lumbar region with hydrocephalus (HCC) [Q05.2 (ICD-10-CM)]; Acute back pain with sciatica, left)      (Diagnosis: Spina bifida with scoliosis and intractable left rib pain)    Scheduled Providers: Sarah Gerardo APRN - CRNA; Radha Condon MD Responsible Provider: Radha Condon MD    Anesthesia Type: General ASA Status: 2            Anesthesia Type: General    Grace Phase I: Grace Score: 10    Grace Phase II:      Anesthesia Post Evaluation    Patient location during evaluation: PACU  Patient participation: complete - patient participated  Level of consciousness: awake and alert  Pain scale: pain adequately controlled.  Airway patency: patent  Nausea & Vomiting: no nausea and no vomiting  Cardiovascular status: hemodynamically stable  Respiratory status: acceptable  Hydration status: euvolemic  Multimodal analgesia pain management approach  Pain management: adequate    No notable events documented.

## 2025-03-25 ENCOUNTER — OFFICE VISIT (OUTPATIENT)
Dept: NEUROSURGERY | Age: 25
End: 2025-03-25
Payer: COMMERCIAL

## 2025-03-25 VITALS
SYSTOLIC BLOOD PRESSURE: 129 MMHG | HEART RATE: 101 BPM | OXYGEN SATURATION: 97 % | BODY MASS INDEX: 39.27 KG/M2 | TEMPERATURE: 97.9 F | HEIGHT: 60 IN | WEIGHT: 200 LBS | DIASTOLIC BLOOD PRESSURE: 81 MMHG

## 2025-03-25 DIAGNOSIS — Z98.2 VP (VENTRICULOPERITONEAL) SHUNT STATUS: ICD-10-CM

## 2025-03-25 DIAGNOSIS — N31.9 NEUROGENIC BLADDER: ICD-10-CM

## 2025-03-25 DIAGNOSIS — Q05.2 SPINA BIFIDA OF LUMBAR REGION WITH HYDROCEPHALUS (HCC): Primary | Chronic | ICD-10-CM

## 2025-03-25 DIAGNOSIS — F90.2 ADHD (ATTENTION DEFICIT HYPERACTIVITY DISORDER), COMBINED TYPE: ICD-10-CM

## 2025-03-25 DIAGNOSIS — M79.2 NEURALGIA: ICD-10-CM

## 2025-03-25 PROCEDURE — 99214 OFFICE O/P EST MOD 30 MIN: CPT | Performed by: NEUROLOGICAL SURGERY

## 2025-03-25 NOTE — PROGRESS NOTES
[facility] OFFICE NOTE:         CC: Routine follow-up for  recheck after intercostal blocks    History of Present Illness:  Carlos Rich (2000) is a 24 y.o.  male who was last seen in the office 1/28/2025  for significant radicular thoracic pain and he was sent for intercostal block and interventional radiology.  He states that it helped a little bit but is still in pain.  Is somewhat mechanical in nature when he leans over he is constantly rubbing that nipple saying that it tingles and feels funny  HPI      Past Medical History:   Diagnosis Date    Neurogenic bladder     S/P  shunt     Spina bifida (HCC)     Tethered cord (HCC)       Past Surgical History:   Procedure Laterality Date    BLADDER AUGMENTATION      LUMBAR LAMINECTOMY FOR TETHERED CORD RELEASE      NECK SURGERY      SHUNT REVISION      x3    TENDON RELEASE      bilateral ankles    VENTRICULOPERITONEAL SHUNT       Current Outpatient Medications   Medication Sig Dispense Refill    sod chloride IRR soln 0.9 % irrigation Irrigation route daily for 30 doses. Via rosales catheter 6000 mL 11    pregabalin (LYRICA) 50 MG capsule Take 1 capsule by mouth 3 times daily for 180 days. Max Daily Amount: 150 mg 90 capsule 5    Baclofen (LIORESAL) 5 MG tablet Take 1 tablet by mouth 3 times daily      cyclobenzaprine (FLEXERIL) 5 MG tablet Take 1 tablet by mouth 3 times daily as needed      acetaminophen (TYLENOL) 325 MG tablet Take 1 tablet by mouth as needed      cetirizine (ZYRTEC) 10 MG tablet Take by mouth      vitamin D (CHOLECALCIFEROL) 25 MCG (1000 UT) TABS tablet Take 1 tablet by mouth daily      lactobacillus (CULTURELLE) CAPS capsule Take 1 capsule by mouth daily      RABEprazole (ACIPHEX) 20 MG tablet TAKE ONE TABLET BY MOUTH ONE TIME DAILY       No current facility-administered medications for this visit.      Allergies   Allergen Reactions    Latex Other (See Comments)      Family History   Problem Relation Age of

## 2025-03-27 ENCOUNTER — TELEPHONE (OUTPATIENT)
Dept: INTERVENTIONAL RADIOLOGY/VASCULAR | Age: 25
End: 2025-03-27

## 2025-03-27 NOTE — TELEPHONE ENCOUNTER
I called patient and spoke to his mother to see how he is doing postoperatively from his intercostal nerve cryoablation 1 week ago.  She states his pain is only slightly improved since the procedure.  Patient has spina bifida resulting in prolonged wheelchair use with several weeks of left anterolateral chest and rib pain.  Due to body habitus he often supports his chest by leaning against the wheelchair armrest and this procedure was offered to attempt to control his chronic pain.  I have encouraged him to follow back up with Dr. Resendiz.  Mom is interested in patient trying to undergo physical therapy which she will request.

## 2025-04-07 NOTE — ASSESSMENT & PLAN NOTE
He is still having some pain it is different that it was prior to his ablation.  I discussed with interventional radiology and they said that he ablated above and below the level of most symptomatic and did not think that there was any benefit to doing any level higher lower than that.  Will restart his Lyrica and order physical therapy to see but now that we done the ablation if stretching and core toning will improve his symptomatology.  Will see him back in 2 to 3 months for recheck

## 2025-04-22 ENCOUNTER — HOSPITAL ENCOUNTER (OUTPATIENT)
Dept: PHYSICAL THERAPY | Age: 25
Setting detail: RECURRING SERIES
Discharge: HOME | End: 2025-04-25
Attending: NEUROLOGICAL SURGERY
Payer: COMMERCIAL

## 2025-04-22 DIAGNOSIS — R07.81 RIB PAIN ON LEFT SIDE: Primary | ICD-10-CM

## 2025-04-22 PROCEDURE — 97161 PT EVAL LOW COMPLEX 20 MIN: CPT

## 2025-04-24 NOTE — THERAPY EVALUATION
Carlossugar Rich  : 2000  Primary: [employer] ([employer] [employer])  Secondary: MEDICAID [facility] @ [place] [address] [place] [address]  Phone: [phone]  Fax: [phone] Plan Frequency: 1-2 xs a week for 6 weeks  Plan of Care/Certification Expiration Date: 25        Plan of Care/Certification Expiration Date:  Plan of Care/Certification Expiration Date: 25    Frequency/Duration: Plan Frequency: 1-2 xs a week for 6 weeks      Time In/Out:   Time In: 1430  Time Out: 1530      PT Visit Info:         Visit Count:  1                OUTPATIENT PHYSICAL THERAPY:             Initial Assessment 2025               Episode (thoracic rib pain)         Treatment Diagnosis:     Rib pain on left side  Medical/Referring Diagnosis:    Spina bifida of lumbar region with hydrocephalus (HCC)  ADHD (attention deficit hyperactivity disorder), combined type  Neurogenic bladder   (ventriculoperitoneal) shunt status  Neuralgia      Referring Physician:  Manuelito Resendiz MD MD Orders:  PT Eval and Treat   Return MD Appt:    Date of Onset:      Allergies:  Latex  Restrictions/Precautions:    None      Medications Last Reviewed: 2025     SUBJECTIVE   History of Injury/Illness (Reason for Referral):  Pt with a complex medical history of spina bifida had a gradual onset of left sided rib  pain and tingling when he was believed to be leaning over. Pt had an ablation in his ribs to help with tingling which did improve symptoms but it still comes on if he sits in his side leaning position for too long  Patient Stated Goal(s):  \"Wants to be pain free\"  Initial Pain Level:       /10   Post Session Pain Level:      /10  Past Medical History/Comorbidities:   Mr. Rich  has a past medical history of Neurogenic bladder, S/P  shunt, Spina bifida (HCC), and Tethered cord (HCC).  Mr. Rich  has a past surgical history that includes Ventriculoperitoneal

## 2025-04-25 ENCOUNTER — TELEPHONE (OUTPATIENT)
Age: 25
End: 2025-04-25

## 2025-04-25 NOTE — TELEPHONE ENCOUNTER
I spoke to patient's mother on 4-23 to update her that she did not need to come in for an office visit as Dr. Erickson has already discussed Carlos's case with Dr. Resendiz.  No additional intervention will be needed.  He underwent intercostal nerve block through our department in February and then on 3-20 had a percutaneous cryoablation of the left intercostal nerves II, III, IV, and 5 for chronic pain.  At first patient did not experience any pain relief but his mother on the phone today indicates that he now states his pain is mild and it used to be moderate.

## 2025-05-07 ENCOUNTER — HOSPITAL ENCOUNTER (OUTPATIENT)
Dept: PHYSICAL THERAPY | Age: 25
Setting detail: RECURRING SERIES
Discharge: HOME | End: 2025-05-10
Attending: NEUROLOGICAL SURGERY
Payer: COMMERCIAL

## 2025-05-07 PROCEDURE — 97110 THERAPEUTIC EXERCISES: CPT

## 2025-05-07 NOTE — PROGRESS NOTES
Carlos Rich  : 2000  [place] [employer] ([employer] [employer]  Secondary: MEDICAID [facility] @ [place] [address] [place] [address]  Phone: [phone]  Fax: [phone] Plan Frequency: 1-2 xs a week for 6 weeks    Plan of Care/Certification Expiration Date: 25        Plan of Care/Certification Expiration Date:  Plan of Care/Certification Expiration Date: 25    Frequency/Duration: Plan Frequency: 1-2 xs a week for 6 weeks      Time In/Out:   Time In: 1300  Time Out: 1345      PT Visit Info:         Visit Count:  2    OUTPATIENT PHYSICAL THERAPY:   Treatment Note 2025       Episode  (thoracic rib pain)               Treatment Diagnosis:    No data found  Medical/Referring Diagnosis:    Spina bifida of lumbar region with hydrocephalus (HCC)  ADHD (attention deficit hyperactivity disorder), combined type  Neurogenic bladder   (ventriculoperitoneal) shunt status  Neuralgia      Referring Physician:  Manuelito Resendiz MD MD Orders:  PT Eval and Treat   Return MD Appt:     Date of Onset:  No data recorded   Allergies:   Latex  Restrictions/Precautions:         Interventions Planned (Treatment may consist of any combination of the following):     See Assessment Note    Subjective Comments:   Pt reports pain is feeling better  Initial Pain Level:      /10  Post Session Pain Level:       /10  Medications Last Reviewed: 2025  Updated Objective Findings:  None Today  Treatment     THERAPEUTIC EXERCISE: (40 minutes):    Exercises per grid below to improve mobility, strength, balance, and coordination.   Progressed resistance and repetitions as indicated.     Date:  2025 Date:   Date:     Activity/Exercise Parameters Parameters Parameters   Edu      pulleys 4 min      rows Blue band lbs 3x10     Lat pulls 30 lbs 3x10  43 lbs 10xs     Overhead press 8 lbs 3x8     Palloff press 2x10 each side     Bike 1 min 3 sets         THERAPEUTIC

## 2025-05-14 ENCOUNTER — APPOINTMENT (OUTPATIENT)
Dept: PHYSICAL THERAPY | Age: 25
End: 2025-05-14
Attending: NEUROLOGICAL SURGERY
Payer: COMMERCIAL

## 2025-05-15 ENCOUNTER — HOSPITAL ENCOUNTER (OUTPATIENT)
Dept: PHYSICAL THERAPY | Age: 25
Setting detail: RECURRING SERIES
Discharge: HOME | End: 2025-05-18
Attending: NEUROLOGICAL SURGERY
Payer: COMMERCIAL

## 2025-05-15 PROCEDURE — 97110 THERAPEUTIC EXERCISES: CPT

## 2025-05-15 NOTE — PROGRESS NOTES
OTONIEL, PT [facility]   6/5/2025  1:00 PM Tyrese Topete, PT [facility]   6/12/2025  1:00 PM Tyrese Topete, PT [facility]   6/16/2025  1:00 PM Tyrese Topete, PT [facility]   6/19/2025  1:00 PM Tyrese Topete, PT [facility]   6/23/2025  1:00 PM Tyrese Topete, PT [facility]   6/26/2025  1:00 PM Tyrese Topete, PT [facility]   3/4/2026  1:15 PM Carol Alex, APRN - CNP [id number]

## 2025-05-16 ENCOUNTER — APPOINTMENT (OUTPATIENT)
Dept: PHYSICAL THERAPY | Age: 25
End: 2025-05-16
Attending: NEUROLOGICAL SURGERY
Payer: COMMERCIAL

## 2025-05-20 ENCOUNTER — APPOINTMENT (OUTPATIENT)
Dept: PHYSICAL THERAPY | Age: 25
End: 2025-05-20
Attending: NEUROLOGICAL SURGERY
Payer: COMMERCIAL

## 2025-05-22 ENCOUNTER — HOSPITAL ENCOUNTER (OUTPATIENT)
Dept: PHYSICAL THERAPY | Age: 25
Setting detail: RECURRING SERIES
Discharge: HOME | End: 2025-05-25
Attending: NEUROLOGICAL SURGERY
Payer: COMMERCIAL

## 2025-05-22 PROCEDURE — 97110 THERAPEUTIC EXERCISES: CPT

## 2025-05-22 NOTE — PROGRESS NOTES
Carlos Rich  : 2000  [place] [employer] [place] [employer] ([employer] [employer]  Secondary: MEDICAID [facility] @ [place] [address] [place] [address]  Phone: [phone]  Fax: [phone] Plan Frequency: 1-2 xs a week for 6 weeks    Plan of Care/Certification Expiration Date: 25        Plan of Care/Certification Expiration Date:  Plan of Care/Certification Expiration Date: 25    Frequency/Duration: Plan Frequency: 1-2 xs a week for 6 weeks      Time In/Out:   Time In: 1345  Time Out: 1430      PT Visit Info:         Visit Count:  4    OUTPATIENT PHYSICAL THERAPY:   Treatment Note 2025       Episode  (thoracic rib pain)               Treatment Diagnosis:    No data found  Medical/Referring Diagnosis:    Spina bifida of lumbar region with hydrocephalus (HCC)  ADHD (attention deficit hyperactivity disorder), combined type  Neurogenic bladder   (ventriculoperitoneal) shunt status  Neuralgia      Referring Physician:  Manuelito Resendiz MD MD Orders:  PT Eval and Treat   Return MD Appt:     Date of Onset:  No data recorded   Allergies:   Latex  Restrictions/Precautions:         Interventions Planned (Treatment may consist of any combination of the following):     See Assessment Note    Subjective Comments:   Pt reports pain is feeling better  Initial Pain Level:      /10  Post Session Pain Level:       /10  Medications Last Reviewed: 2025  Updated Objective Findings:  None Today  Treatment     THERAPEUTIC EXERCISE: (40 minutes):    Exercises per grid below to improve mobility, strength, balance, and coordination.   Progressed resistance and repetitions as indicated.     Date:  2025 Date:  5/15/2025 Date:  2025   Activity/Exercise Parameters Parameters Parameters   Edu      pulleys 4 min  15xs 15xs   rows Blue band lbs 3x10 Blue band 30xs Red band 30xs    Lat pulls 30 lbs 3x10  43 lbs 10xs 33 lbs 3x10 37 lbs 3x10   Overhead press 8 lbs

## 2025-05-27 ENCOUNTER — APPOINTMENT (OUTPATIENT)
Dept: PHYSICAL THERAPY | Age: 25
End: 2025-05-27
Attending: NEUROLOGICAL SURGERY
Payer: COMMERCIAL

## 2025-05-27 ENCOUNTER — OFFICE VISIT (OUTPATIENT)
Dept: NEUROSURGERY | Age: 25
End: 2025-05-27
Payer: COMMERCIAL

## 2025-05-27 VITALS
HEIGHT: 60 IN | TEMPERATURE: 98.1 F | SYSTOLIC BLOOD PRESSURE: 131 MMHG | BODY MASS INDEX: 39.27 KG/M2 | WEIGHT: 200 LBS | DIASTOLIC BLOOD PRESSURE: 82 MMHG | OXYGEN SATURATION: 100 % | HEART RATE: 96 BPM

## 2025-05-27 DIAGNOSIS — M79.2 NEURALGIA: Primary | ICD-10-CM

## 2025-05-27 PROCEDURE — 99214 OFFICE O/P EST MOD 30 MIN: CPT | Performed by: NEUROLOGICAL SURGERY

## 2025-05-27 NOTE — PROGRESS NOTES
[facility] OFFICE NOTE:         CC: Routine follow-up for chest wall pain    History of Present Illness:  Carlos Rich (2000) is a 24 y.o.  male who returns for follow-up of his chest wall pain after physical therapy.  He states with physical therapy to stretching exercises he has had complete resolution of his chest wall pain.  Overall he is doing very well  HPI      Past Medical History:   Diagnosis Date    Neurogenic bladder     S/P  shunt     Spina bifida (HCC)     Tethered cord (HCC)       Past Surgical History:   Procedure Laterality Date    BLADDER AUGMENTATION      LUMBAR LAMINECTOMY FOR TETHERED CORD RELEASE      NECK SURGERY      SHUNT REVISION      x3    TENDON RELEASE      bilateral ankles    VENTRICULOPERITONEAL SHUNT        Current Outpatient Medications   Medication Sig Dispense Refill    sod chloride IRR soln 0.9 % irrigation Irrigation route daily for 30 doses. Via rosales catheter 6000 mL 11    pregabalin (LYRICA) 50 MG capsule Take 1 capsule by mouth 3 times daily for 180 days. Max Daily Amount: 150 mg 90 capsule 5    acetaminophen (TYLENOL) 325 MG tablet Take 1 tablet by mouth as needed      cetirizine (ZYRTEC) 10 MG tablet Take by mouth      vitamin D (CHOLECALCIFEROL) 25 MCG (1000 UT) TABS tablet Take 1 tablet by mouth daily      lactobacillus (CULTURELLE) CAPS capsule Take 1 capsule by mouth daily      RABEprazole (ACIPHEX) 20 MG tablet TAKE ONE TABLET BY MOUTH ONE TIME DAILY      Baclofen (LIORESAL) 5 MG tablet Take 1 tablet by mouth 3 times daily      cyclobenzaprine (FLEXERIL) 5 MG tablet Take 1 tablet by mouth 3 times daily as needed       No current facility-administered medications for this visit.      Allergies   Allergen Reactions    Latex Other (See Comments)      Family History   Problem Relation Age of Onset    Dementia Maternal Grandmother     Stroke Maternal Grandmother       Social History     Socioeconomic History    Marital status:

## 2025-05-29 ENCOUNTER — HOSPITAL ENCOUNTER (OUTPATIENT)
Dept: PHYSICAL THERAPY | Age: 25
Setting detail: RECURRING SERIES
End: 2025-05-29
Attending: NEUROLOGICAL SURGERY
Payer: COMMERCIAL

## 2025-05-29 PROCEDURE — 97110 THERAPEUTIC EXERCISES: CPT

## 2025-06-02 ENCOUNTER — HOSPITAL ENCOUNTER (OUTPATIENT)
Dept: PHYSICAL THERAPY | Age: 25
Setting detail: RECURRING SERIES
Discharge: HOME | End: 2025-06-05
Attending: NEUROLOGICAL SURGERY
Payer: COMMERCIAL

## 2025-06-02 PROCEDURE — 97110 THERAPEUTIC EXERCISES: CPT

## 2025-06-02 NOTE — PROGRESS NOTES
Carlos Rich  : 2000  [place]: [employer] [place] [employer]  Secondary: MEDICAID [facility] [place] @ [place] [address] [place] [address]  Phone: [phone]  Fax: [phone] Plan Frequency: 1-2 xs a week for 6 weeks    Plan of Care/Certification Expiration Date: 25        Plan of Care/Certification Expiration Date:  Plan of Care/Certification Expiration Date: 25    Frequency/Duration: Plan Frequency: 1-2 xs a week for 6 weeks      Time In/Out:   Time In: 1345  Time Out: 1430      PT Visit Info:         Visit Count:  6    OUTPATIENT PHYSICAL THERAPY:   Treatment Note 2025       Episode  (thoracic rib pain)               Treatment Diagnosis:    No data found  Medical/Referring Diagnosis:    Spina bifida of lumbar region with hydrocephalus (HCC)  ADHD (attention deficit hyperactivity disorder), combined type  Neurogenic bladder   (ventriculoperitoneal) shunt status  Neuralgia      Referring Physician:  Manuelito Resendiz MD MD Orders:  PT Eval and Treat   Return MD Appt:     Date of Onset:  No data recorded   Allergies:   Latex  Restrictions/Precautions:         Interventions Planned (Treatment may consist of any combination of the following):     See Assessment Note    Subjective Comments:   Pt reports pain is feeling better  Initial Pain Level:      /10  Post Session Pain Level:       /10  Medications Last Reviewed: 2025  Updated Objective Findings:  None Today  Treatment     THERAPEUTIC EXERCISE: (40 minutes):    Exercises per grid below to improve mobility, strength, balance, and coordination.   Progressed resistance and repetitions as indicated.     Date:  2025 Date:  5/15/2025 Date:  2025 Date  2025 Date  2025   Activity/Exercise Parameters Parameters Parameters     Edu        pulleys 4 min  15xs 15xs 20xs 20xs   rows Blue band lbs 3x10 Blue band 30xs Red band 30xs  Face pulls 20xs 10 lbs Face pulls 20xs 10

## 2025-06-05 ENCOUNTER — HOSPITAL ENCOUNTER (OUTPATIENT)
Dept: PHYSICAL THERAPY | Age: 25
Setting detail: RECURRING SERIES
Discharge: HOME | End: 2025-06-08
Attending: NEUROLOGICAL SURGERY
Payer: COMMERCIAL

## 2025-06-05 PROCEDURE — 97110 THERAPEUTIC EXERCISES: CPT

## 2025-06-05 NOTE — PROGRESS NOTES
Carlos Rich  : 2000  Primary: [employer] ([employer])  Secondary: MEDICAID [facility] @ [place] [address] [place] [address]  Phone: [phone]  Fax: [phone] Plan Frequency: 1-2 xs a week for 6 weeks    Plan of Care/Certification Expiration Date: 25        Plan of Care/Certification Expiration Date:  Plan of Care/Certification Expiration Date: 25    Frequency/Duration: Plan Frequency: 1-2 xs a week for 6 weeks      Time In/Out:   Time In: 1515  Time Out: 1600      PT Visit Info:         Visit Count:  7    OUTPATIENT PHYSICAL THERAPY:   Treatment Note 2025       Episode  (thoracic rib pain)               Treatment Diagnosis:    No data found  Medical/Referring Diagnosis:    Spina bifida of lumbar region with hydrocephalus (HCC)  ADHD (attention deficit hyperactivity disorder), combined type  Neurogenic bladder   (ventriculoperitoneal) shunt status  Neuralgia      Referring Physician:  Manuelito Resendiz MD MD Orders:  PT Eval and Treat   Return MD Appt:     Date of Onset:  No data recorded   Allergies:   Latex  Restrictions/Precautions:         Interventions Planned (Treatment may consist of any combination of the following):     See Assessment Note    Subjective Comments:   Pt reports pain is feeling better  Initial Pain Level:      /10  Post Session Pain Level:       /10  Medications Last Reviewed: 2025  Updated Objective Findings:  None Today  Treatment     THERAPEUTIC EXERCISE: (40 minutes):    Exercises per grid below to improve mobility, strength, balance, and coordination.   Progressed resistance and repetitions as indicated.     Date:  2025 Date:  5/15/2025 Date:  2025 Date  2025 Date  2025 Date  2025   Activity/Exercise Parameters Parameters Parameters      Edu         pulleys 4 min  15xs 15xs 20xs 20xs    rows Blue band lbs 3x10 Blue band 30xs Red band 30xs  Face pulls 20xs 10 lbs

## 2025-06-12 ENCOUNTER — APPOINTMENT (OUTPATIENT)
Dept: PHYSICAL THERAPY | Age: 25
End: 2025-06-12
Attending: NEUROLOGICAL SURGERY
Payer: COMMERCIAL

## 2025-06-12 ENCOUNTER — HOSPITAL ENCOUNTER (OUTPATIENT)
Dept: PHYSICAL THERAPY | Age: 25
Setting detail: RECURRING SERIES
Discharge: HOME | End: 2025-06-15
Attending: NEUROLOGICAL SURGERY
Payer: COMMERCIAL

## 2025-06-12 PROCEDURE — 97110 THERAPEUTIC EXERCISES: CPT

## 2025-06-12 NOTE — PROGRESS NOTES
30xs 10 lbs  13 lbs 3x10 Facepulls     Lat pulls 33 lbs 3x10 37 lbs 3x10 40 lbs 3x10 43 lbs 3x10 47 lbs 3x10 50 lbs 3x10    Overhead press 10 lbs 3x10 10 lbs 3x10 15 lbs 3x10 20 lbs 3x8 25 lbs 3x10 30 lbs 3x8   Palloff press  2x10 each side purple band 2x10 each side purple band 2x10 each side purple band     Pull apart     Red band 20xs  Red band 20xs   No money Blue band 20xs   Red band 25xs       Russians twists      6 lbs 5 taps each side for 3 sets   DIagonals Yellow band 2x10  Yellow band 20xs Yellow 30xs Yellow band 20xs    Bike             THERAPEUTIC ACTIVITY: ( 0 minutes):    Therapeutic activities per grid below to improve mobility, strength, coordination, and dynamic movement to improve functional lifting, carrying, reaching, catching, and overhead activities.   Date:  6/12/2025 Date:   Date:     Activity/Exercise Parameters Parameters Parameters                                                 MANUAL THERAPY: (0 minutes):   Joint mobilization, Soft tissue mobilization, and Manipulation was utilized and necessary because of the patient's restricted joint motion, painful spasm, loss of articular motion, and restricted motion of soft tissue.              Date  6/12/2025      Technique Used Grade Level # Time(s) Effect while being performed                                                                                         HEP Log Date        2.     3.    4.     5.        POC    Recertification Expiration Date      Plan of Care/Certification Expiration Date: 06/23/25     Visit Count  8    Number of Allowed Visits              Treatment/Session Summary:      Treatment Assessment:      Pt continued with strengthening. No pain with exercises.   Communication/Consultation:       None today   Equipment provided today:  None   Recommendations/Intent for next  treatment session:  Next visit will focus on Mobility and core strengthening.         >Total Treatment Billable Duration:  40 minutes   Time In:

## 2025-06-16 ENCOUNTER — HOSPITAL ENCOUNTER (OUTPATIENT)
Dept: PHYSICAL THERAPY | Age: 25
Setting detail: RECURRING SERIES
Discharge: HOME | End: 2025-06-19
Attending: NEUROLOGICAL SURGERY
Payer: COMMERCIAL

## 2025-06-16 PROCEDURE — 97110 THERAPEUTIC EXERCISES: CPT

## 2025-06-16 NOTE — PROGRESS NOTES
Facepulls   17 lbs 3x10   Lat pulls 37 lbs 3x10 40 lbs 3x10 43 lbs 3x10 47 lbs 3x10 50 lbs 3x10  80 lbs 3x5   Overhead press 10 lbs 3x10 15 lbs 3x10 20 lbs 3x8 25 lbs 3x10 30 lbs 3x8 35 lbs 3x8    Palloff press 2x10 each side purple band 2x10 each side purple band 2x10 each side purple band      Pull apart    Red band 20xs  Red band 20xs Red band 20xs   No money  Red band 25xs        Russians twists     6 lbs 5 taps each side for 3 sets    DIagonals  Yellow band 20xs Yellow 30xs Yellow band 20xs  Yellow bands 20xs   Bike             THERAPEUTIC ACTIVITY: ( 0 minutes):    Therapeutic activities per grid below to improve mobility, strength, coordination, and dynamic movement to improve functional lifting, carrying, reaching, catching, and overhead activities.   Date:  6/16/2025 Date:   Date:     Activity/Exercise Parameters Parameters Parameters                                                 MANUAL THERAPY: (0 minutes):   Joint mobilization, Soft tissue mobilization, and Manipulation was utilized and necessary because of the patient's restricted joint motion, painful spasm, loss of articular motion, and restricted motion of soft tissue.              Date  6/16/2025      Technique Used Grade Level # Time(s) Effect while being performed                                                                                         HEP Log Date        2.     3.    4.     5.        POC    Recertification Expiration Date      Plan of Care/Certification Expiration Date: 06/23/25     Visit Count  9    Number of Allowed Visits              Treatment/Session Summary:      Treatment Assessment:      Pt continued with strengthening. No pain with exercises.   Communication/Consultation:       None today   Equipment provided today:  None   Recommendations/Intent for next  treatment session:  Next visit will focus on Mobility and core strengthening.         >Total Treatment Billable Duration:  40 minutes   Time In: 1300  Time Out: 3376

## 2025-06-18 ENCOUNTER — APPOINTMENT (OUTPATIENT)
Dept: PHYSICAL THERAPY | Age: 25
End: 2025-06-18
Attending: NEUROLOGICAL SURGERY
Payer: COMMERCIAL

## 2025-06-19 ENCOUNTER — HOSPITAL ENCOUNTER (OUTPATIENT)
Dept: PHYSICAL THERAPY | Age: 25
Setting detail: RECURRING SERIES
Discharge: HOME | End: 2025-06-22
Attending: NEUROLOGICAL SURGERY
Payer: COMMERCIAL

## 2025-06-19 PROCEDURE — 97110 THERAPEUTIC EXERCISES: CPT

## 2025-06-19 NOTE — PROGRESS NOTES
Carlos Rich  : 2000  Primary: [employer] ([employer])  Secondary: MEDICAID [facility] @ [place] [address] [place] [address]  Phone: [phone]  Fax: [phone] Plan Frequency: 1-2 xs a week for 6 weeks    Plan of Care/Certification Expiration Date: 25        Plan of Care/Certification Expiration Date:  Plan of Care/Certification Expiration Date: 25    Frequency/Duration: Plan Frequency: 1-2 xs a week for 6 weeks      Time In/Out:   Time In: 1515  Time Out: 1600      PT Visit Info:         Visit Count:  10    OUTPATIENT PHYSICAL THERAPY:   Treatment Note 2025       Episode  (thoracic rib pain)               Treatment Diagnosis:    No data found  Medical/Referring Diagnosis:    Spina bifida of lumbar region with hydrocephalus (HCC)  ADHD (attention deficit hyperactivity disorder), combined type  Neurogenic bladder   (ventriculoperitoneal) shunt status  Neuralgia      Referring Physician:  Manuelito Resendiz MD MD Orders:  PT Eval and Treat   Return MD Appt:     Date of Onset:  No data recorded   Allergies:   Latex  Restrictions/Precautions:         Interventions Planned (Treatment may consist of any combination of the following):     See Assessment Note    Subjective Comments:   Pt reports pain is feeling better  Initial Pain Level:      /10  Post Session Pain Level:       /10  Medications Last Reviewed: 2025  Updated Objective Findings:  None Today  Treatment     THERAPEUTIC EXERCISE: (40 minutes):    Exercises per grid below to improve mobility, strength, balance, and coordination.   Progressed resistance and repetitions as indicated.     Date  2025 Date  2025 Date  2025 Date  2025 Date  2025 Date  2025   Activity/Exercise         Edu         pulleys 20xs 20xs   10xs 15xs   rows Face pulls 20xs 10 lbs Face pulls 20xs 10 lbs Facepulls 30xs 10 lbs  13 lbs 3x10 Facepulls   17 lbs 3x10 17

## 2025-06-23 ENCOUNTER — HOSPITAL ENCOUNTER (OUTPATIENT)
Dept: PHYSICAL THERAPY | Age: 25
Setting detail: RECURRING SERIES
Discharge: HOME | End: 2025-06-26
Attending: NEUROLOGICAL SURGERY
Payer: COMMERCIAL

## 2025-06-23 PROCEDURE — 97110 THERAPEUTIC EXERCISES: CPT

## 2025-06-23 NOTE — PROGRESS NOTES
Carlos Rich  : 2000  Primary: [employer] ([employer] [employer])  Secondary: MEDICAID [facility] @ [place] [address] [place] [address]  Phone: [phone]  Fax: [phone] Plan Frequency: 1-2 xs a week for 6 weeks    Plan of Care/Certification Expiration Date: 25        Plan of Care/Certification Expiration Date:  Plan of Care/Certification Expiration Date: 25    Frequency/Duration: Plan Frequency: 1-2 xs a week for 6 weeks      Time In/Out:   Time In: 1300  Time Out: 1345      PT Visit Info:         Visit Count:  11    OUTPATIENT PHYSICAL THERAPY:   Treatment Note 2025       Episode  (thoracic rib pain)               Treatment Diagnosis:    No data found  Medical/Referring Diagnosis:    Spina bifida of lumbar region with hydrocephalus (HCC)  ADHD (attention deficit hyperactivity disorder), combined type  Neurogenic bladder   (ventriculoperitoneal) shunt status  Neuralgia      Referring Physician:  Manuelito Resendiz MD MD Orders:  PT Eval and Treat   Return MD Appt:     Date of Onset:  No data recorded   Allergies:   Latex  Restrictions/Precautions:         Interventions Planned (Treatment may consist of any combination of the following):     See Assessment Note    Subjective Comments:   Pt reports pain is feeling better  Initial Pain Level:      /10  Post Session Pain Level:       /10  Medications Last Reviewed: 2025  Updated Objective Findings:  None Today  Treatment     THERAPEUTIC EXERCISE: (40 minutes):    Exercises per grid below to improve mobility, strength, balance, and coordination.   Progressed resistance and repetitions as indicated.     Date  2025 Date  2025 Date  2025 Date  2025 Date  2025   Activity/Exercise        Edu        pulleys   10xs 15xs 15xs   rows Facepulls 30xs 10 lbs  13 lbs 3x10 Facepulls   17 lbs 3x10 17 lbs 3x10  17 lbs 3x10   Lat pulls 47 lbs 3x10 50 lbs 3x10  80 lbs

## 2025-06-25 ENCOUNTER — APPOINTMENT (OUTPATIENT)
Dept: PHYSICAL THERAPY | Age: 25
End: 2025-06-25
Attending: NEUROLOGICAL SURGERY
Payer: COMMERCIAL

## 2025-06-26 ENCOUNTER — HOSPITAL ENCOUNTER (OUTPATIENT)
Dept: PHYSICAL THERAPY | Age: 25
Setting detail: RECURRING SERIES
Discharge: HOME | End: 2025-06-29
Attending: NEUROLOGICAL SURGERY
Payer: COMMERCIAL

## 2025-06-26 PROCEDURE — 97110 THERAPEUTIC EXERCISES: CPT

## 2025-06-26 NOTE — PROGRESS NOTES
Carlos Rich  : 2000  Primary: [employer] ([employer] [employer])  Secondary: MEDICAID [facility] [place] [address] [place] [address]  Phone: [phone]  Fax: [phone] Plan Frequency: 1-2 xs a week for 6 weeks    Plan of Care/Certification Expiration Date: 25        Plan of Care/Certification Expiration Date:  Plan of Care/Certification Expiration Date: 25    Frequency/Duration: Plan Frequency: 1-2 xs a week for 6 weeks      Time In/Out:   Time In: 1515  Time Out: 1600      PT Visit Info:         Visit Count:  12    OUTPATIENT PHYSICAL THERAPY:   Treatment Note 2025       Episode  (thoracic rib pain)               Treatment Diagnosis:    No data found  Medical/Referring Diagnosis:    Spina bifida of lumbar region with hydrocephalus (HCC)  ADHD (attention deficit hyperactivity disorder), combined type  Neurogenic bladder   (ventriculoperitoneal) shunt status  Neuralgia      Referring Physician:  Manuelito Resendiz MD MD Orders:  PT Eval and Treat   Return MD Appt:     Date of Onset:  No data recorded   Allergies:   Latex  Restrictions/Precautions:         Interventions Planned (Treatment may consist of any combination of the following):     See Assessment Note    Subjective Comments:   Pt reports pain is feeling better  Initial Pain Level:      /10  Post Session Pain Level:       /10  Medications Last Reviewed: 2025  Updated Objective Findings:  None Today  Treatment     THERAPEUTIC EXERCISE: (40 minutes):    Exercises per grid below to improve mobility, strength, balance, and coordination.   Progressed resistance and repetitions as indicated.     Date  2025 Date  2025 Date  2025 Date  2025 Date  2025   Activity/Exercise        Edu     Education on discharge instructions   pulleys  10xs 15xs 15xs 15xs   rows 13 lbs 3x10 Facepulls   17 lbs 3x10 17 lbs 3x10  17 lbs 3x10    Lat pulls 50 lbs 3x10  80

## 2025-06-27 NOTE — THERAPY DISCHARGE
Carlos Rich  : 2000  Primary: [employer] ([place] [facility])  Secondary: MEDICAID [facility] @ [place] [address] [place] [address]  Phone: [phone]  Fax: [phone] Plan Frequency: 1-2 xs a week for 6 weeks  Plan of Care/Certification Expiration Date: 25        Plan of Care/Certification Expiration Date:  Plan of Care/Certification Expiration Date: 25    Frequency/Duration: Plan Frequency: 1-2 xs a week for 6 weeks      Time In/Out:   Time In: 1515  Time Out: 1600      PT Visit Info:         Visit Count:  12                OUTPATIENT PHYSICAL THERAPY:             Discharge Summary 2025               Episode (thoracic rib pain)         Treatment Diagnosis:     No data found  Medical/Referring Diagnosis:    Spina bifida of lumbar region with hydrocephalus (HCC)  ADHD (attention deficit hyperactivity disorder), combined type  Neurogenic bladder   (ventriculoperitoneal) shunt status  Neuralgia      Referring Physician:  Manuelito Resendiz MD MD Orders:  PT Eval and Treat   Return MD Appt:    Date of Onset:      Allergies:  Latex  Restrictions/Precautions:    None      Medications Last Reviewed: 2025     SUBJECTIVE   History of Injury/Illness (Reason for Referral):  Pt with a complex medical history of spina bifida had a gradual onset of left sided rib  pain and tingling when he was believed to be leaning over. Pt had an ablation in his ribs to help with tingling which did improve symptoms but it still comes on if he sits in his side leaning position for too long  Patient Stated Goal(s):  \"Wants to be pain free\"  Initial Pain Level:       /10   Post Session Pain Level:      /10  Past Medical History/Comorbidities:   Mr. Rich  has a past medical history of Neurogenic bladder, S/P  shunt, Spina bifida (HCC), and Tethered cord (HCC).  Mr. Rich  has a past surgical history that includes Ventriculoperitoneal shunt;